# Patient Record
Sex: MALE | Race: BLACK OR AFRICAN AMERICAN | Employment: FULL TIME | URBAN - METROPOLITAN AREA
[De-identification: names, ages, dates, MRNs, and addresses within clinical notes are randomized per-mention and may not be internally consistent; named-entity substitution may affect disease eponyms.]

---

## 2021-03-20 ENCOUNTER — HOSPITAL ENCOUNTER (EMERGENCY)
Age: 36
Discharge: HOME | End: 2021-03-20
Attending: EMERGENCY MEDICINE
Payer: COMMERCIAL

## 2021-03-20 VITALS
RESPIRATION RATE: 22 BRPM | SYSTOLIC BLOOD PRESSURE: 137 MMHG | HEART RATE: 97 BPM | WEIGHT: 246.03 LBS | DIASTOLIC BLOOD PRESSURE: 90 MMHG | OXYGEN SATURATION: 100 % | BODY MASS INDEX: 35.3 KG/M2 | TEMPERATURE: 98.1 F

## 2021-03-20 DIAGNOSIS — R03.0 ELEVATED BLOOD PRESSURE READING: ICD-10-CM

## 2021-03-20 DIAGNOSIS — F43.29 ADJUSTMENT DISORDER WITH OTHER SYMPTOM: Primary | ICD-10-CM

## 2021-03-20 DIAGNOSIS — G47.00 INSOMNIA, UNSPECIFIED TYPE: ICD-10-CM

## 2021-03-20 PROCEDURE — 99283 EMERGENCY DEPT VISIT LOW MDM: CPT

## 2021-03-20 RX ORDER — ZOLPIDEM TARTRATE 5 MG/1
5 TABLET ORAL NIGHTLY PRN
Qty: 2 TABLET | Refills: 0 | Status: SHIPPED | OUTPATIENT
Start: 2021-03-20 | End: 2021-03-22

## 2021-03-20 NOTE — ED NOTES
Pt tearful on exam    Pt denies any thoughts of harming self or others     Kimberlyn Sales RN  03/20/21 7318

## 2021-03-20 NOTE — ED NOTES
Transfer center called for infectious  Disease consult to call back     Anthony Goldman RN  03/20/21 8738

## 2021-03-20 NOTE — ED PROVIDER NOTES
CHIEF COMPLAINT  Other (pt states was told recently positive for HIV  states needs help with follow up)      HISTORY OF PRESENT ILLNESS  Dayna Almodovar is a 37 y.o. male who presents to the ED complaining of stress. The patient states 5 days ago he was having some symptoms and presented to an outside hospital emergency department. During that work-up they sent an HIV test on the patient. It came back positive and they contacted the patient and let them know that he had HIV. He states he was given resources but those resources have not yet called him and he is just having difficulty processing the stress of being diagnosed with HIV, thinking through the difficult conversations that he needs to have with family and friends, and its causing it to be difficult to sleep at night which is making it harder yet again to process this. He denies any suicidal or homicidal ideation. He denies any symptoms at this time. No other complaints, modifying factors or associated symptoms. Nursing notes reviewed. Past Medical History:   Diagnosis Date    Strong-Parkinson-White syndrome      Past Surgical History:   Procedure Laterality Date    ABLATION OF DYSRHYTHMIC FOCUS       No family history on file.   Social History     Socioeconomic History    Marital status: Single     Spouse name: Not on file    Number of children: Not on file    Years of education: Not on file    Highest education level: Not on file   Occupational History    Not on file   Social Needs    Financial resource strain: Not on file    Food insecurity     Worry: Not on file     Inability: Not on file    Transportation needs     Medical: Not on file     Non-medical: Not on file   Tobacco Use    Smoking status: Current Every Day Smoker     Types: Cigars    Smokeless tobacco: Current User   Substance and Sexual Activity    Alcohol use: Yes     Comment: occassionally    Drug use: Yes     Types: Marijuana    Sexual activity: Not on file Lifestyle    Physical activity     Days per week: Not on file     Minutes per session: Not on file    Stress: Not on file   Relationships    Social connections     Talks on phone: Not on file     Gets together: Not on file     Attends [employer] service: Not on file     Active member of club or organization: Not on file     Attends meetings of clubs or organizations: Not on file     Relationship status: Not on file    Intimate partner violence     Fear of current or ex partner: Not on file     Emotionally abused: Not on file     Physically abused: Not on file     Forced sexual activity: Not on file   Other Topics Concern    Not on file   Social History Narrative    Not on file     No current facility-administered medications for this encounter. Current Outpatient Medications   Medication Sig Dispense Refill    zolpidem (AMBIEN) 5 MG tablet Take 1 tablet by mouth nightly as needed for Sleep for up to 2 days. 2 tablet 0     No Known Allergies    REVIEW OF SYSTEMS  6 systems reviewed, pertinent positives per HPI otherwise noted to be negative    PHYSICAL EXAM  BP (!) 143/96   Pulse 100   Temp 98.1 °F (36.7 °C) (Oral)   Resp 22   Wt 246 lb 0.5 oz (111.6 kg)   SpO2 100%   BMI 35.30 kg/m²   GENERAL APPEARANCE: Awake and alert. Cooperative. No acute distress. HEAD: Normocephalic. Atraumatic. EYES: PERRL. EOM's grossly intact. No scleral icterus  ENT: Mucous membranes are moist.  No thrush  NECK: Supple. Normal ROM. CHEST: Equal symmetric chest rise. LUNGS: Breathing is unlabored. Speaking comfortably in full sentences. SKIN: Warm and dry. NEUROLOGICAL: Alert and oriented. Normal coordination. Gait normal.   Psychiatric: Tearful at times during the history. He adamantly denies any suicidal or homicidal ideation but states he is frustrated with the diagnosis. RADIOLOGY  X-RAYS:  I have reviewed radiologic plain film image(s).   ALL OTHER NON-PLAIN FILM IMAGES SUCH AS CT, ULTRASOUND AND MRI HAVE BEEN READ BY THE RADIOLOGIST. No orders to display              PROCEDURES    ED COURSE/MDM  Patient seen and evaluated. I was fortunate that the ED was not busy and I was able to spend a lengthy amount of time talking with the patient about what his needs are. He reports that he needs help establishing follow-up with an infectious disease specialist.  The patient works at [employer] and would like to see an infectious disease specialist outside of  if possible. I will consult my infectious disease doctor and see if he/she are able to take new patients. We will also give the patient Linda, her 's cell phone number so that he can reach out to her if he is struggling to get established with a medical provider. She has been extremely helpful in helping her patients navigate the healthcare system. I told the patient I was amenable to a 1 or 2-day supply of Ambien to try to reset his sleep cycle. This patient is being very upfront and forthright with me. He is adamant that he does not have any suicidal or homicidal ideations, he has just been struggling with issues with embarrassment, frustration over his recent diagnosis of HIV. We talked about some strategies of how to approach the conversations with his family. He contracted for safety and states he would certainly come back should he have any thoughts of hurting himself or others. I reassured the patient that while he was recently diagnosed with HIV it does not change how wonderful he is is a human being, and how much people care for him, and how much we value him. The patient expressed gratitude for our ability to have a conversation about some of these issues.   At this time I see no emergent medical issues that need further work-up in the ED or referral.  The patient was strongly encouraged to come back immediately should he have any thoughts of harming himself or others, or should he have further difficulties that arise. Lastly the patient's blood pressure was elevated in the ED. He will be given the no PCP follow-up process for repeat blood pressure in 7 to 10 days. I do feel patient can be safely discharged to home. Recommend follow up with infectious disease for re-evaluation. Reasons to RT ED discussed. Patient expresses understanding and is in agreement with plan. Patient's HR is 100, but smoked just prior to arrival.  I do not think he is septic. Patient was given scripts for the following medications. I counseled patient how to take these medications. New Prescriptions    ZOLPIDEM (AMBIEN) 5 MG TABLET    Take 1 tablet by mouth nightly as needed for Sleep for up to 2 days. CLINICAL IMPRESSION  1. Adjustment disorder with other symptom    2. Insomnia, unspecified type    3. Elevated blood pressure reading        Blood pressure (!) 143/96, pulse 100, temperature 98.1 °F (36.7 °C), temperature source Oral, resp. rate 22, weight 246 lb 0.5 oz (111.6 kg), SpO2 100 %. DISPOSITION  Patient was discharged to home in good condition.          Paz Addison MD  03/20/21 2726

## 2021-03-30 ENCOUNTER — OFFICE VISIT (OUTPATIENT)
Dept: PSYCHOLOGY | Age: 36
End: 2021-03-30
Payer: COMMERCIAL

## 2021-03-30 DIAGNOSIS — F43.23 ADJUSTMENT DISORDER WITH MIXED ANXIETY AND DEPRESSED MOOD: Primary | ICD-10-CM

## 2021-03-30 PROCEDURE — 90791 PSYCH DIAGNOSTIC EVALUATION: CPT | Performed by: PSYCHOLOGIST

## 2021-03-30 ASSESSMENT — PATIENT HEALTH QUESTIONNAIRE - PHQ9
2. FEELING DOWN, DEPRESSED OR HOPELESS: 2
5. POOR APPETITE OR OVEREATING: 0
8. MOVING OR SPEAKING SO SLOWLY THAT OTHER PEOPLE COULD HAVE NOTICED. OR THE OPPOSITE, BEING SO FIGETY OR RESTLESS THAT YOU HAVE BEEN MOVING AROUND A LOT MORE THAN USUAL: 1
4. FEELING TIRED OR HAVING LITTLE ENERGY: 1
SUM OF ALL RESPONSES TO PHQ QUESTIONS 1-9: 9
1. LITTLE INTEREST OR PLEASURE IN DOING THINGS: 0
9. THOUGHTS THAT YOU WOULD BE BETTER OFF DEAD, OR OF HURTING YOURSELF: 0
SUM OF ALL RESPONSES TO PHQ QUESTIONS 1-9: 9

## 2021-03-30 ASSESSMENT — ANXIETY QUESTIONNAIRES
7. FEELING AFRAID AS IF SOMETHING AWFUL MIGHT HAPPEN: 2-OVER HALF THE DAYS
1. FEELING NERVOUS, ANXIOUS, OR ON EDGE: 2-OVER HALF THE DAYS
6. BECOMING EASILY ANNOYED OR IRRITABLE: 0-NOT AT ALL
2. NOT BEING ABLE TO STOP OR CONTROL WORRYING: 2-OVER HALF THE DAYS
GAD7 TOTAL SCORE: 10
3. WORRYING TOO MUCH ABOUT DIFFERENT THINGS: 2-OVER HALF THE DAYS

## 2021-03-30 NOTE — PATIENT INSTRUCTIONS
Patient Education        Nickie-Parkinson-White (WPW) Syndrome: Care Instructions  Your Care Instructions     Nickie-Parkinson-White (WPW) syndrome is a heart rhythm problem that causes a very fast heart rate. It happens because you have an extra electrical pathway in your heart. WPW is a congenital heart problem. This means you were born with the problem. You may have a fast heart rate or feel a fluttering in your chest (palpitations), feel lightheaded or dizzy, or faint. When you have these symptoms, it is called an episode. You may never have an episode, rarely have one, or have one once or twice a week. Very rarely, a WPW episode can trigger a heart rhythm that can cause death. Your doctor may prescribe medicines to help slow down your heartbeat. Your doctor may also suggest you try vagal maneuvers when having an episode of WPW. These are things, like bearing down, that might help slow your heart rate. Bearing down means that you try to breathe out with your stomach muscles but you don't let air out of your nose or mouth. Your doctor can show you how to do vagal maneuvers. He or she may suggest that you lie down on your back to do them. In some cases, a procedure called catheter ablation is done. Follow-up care is a key part of your treatment and safety. Be sure to make and go to all appointments, and call your doctor if you are having problems. It's also a good idea to know your test results and keep a list of the medicines you take. How can you care for yourself at home? · Take your medicines exactly as prescribed. Call your doctor if you think you are having a problem with your medicine. You will get more details on the specific medicines your doctor prescribes. · If your doctor showed you how to do vagal maneuvers, try them when you have an episode. These maneuvers include bearing down or putting an ice-cold, wet towel on your face. · Monitor your condition by keeping a diary of your WPW episodes. Bring this to your doctor appointments. First, you'll need to count your heart rate (take your pulse). · After you check your heart rate, write down:  ? How fast or slow your heart was beating. ? If your heart rhythm was regular or irregular. ? What symptoms you had.  ? The time of day your symptoms occurred. ? How long your symptoms lasted. ? What you were doing when your symptoms started. ? What may have helped your symptoms go away. · If they trigger episodes, limit or avoid alcohol or drinks with caffeine. · Do not use over-the-counter decongestants, diet pills, or \"pep\" pills. They often contain ingredients that make your heart beat faster (stimulants). · Do not use illegal drugs, such as cocaine, ecstasy, or methamphetamine, which can speed up your heart's rhythm. · Do not smoke. Smoking can make this condition worse. If you need help quitting, talk to your doctor about stop-smoking programs and medicines. These can increase your chances of quitting for good. When should you call for help? Call 911 anytime you think you may need emergency care. For example, call if:    · You passed out (lost consciousness).     · You are short of breath. Call your doctor now or seek immediate medical care if:    · You have a fast heartbeat.     · You are dizzy or lightheaded, or you feel like you may faint. Watch closely for changes in your health, and be sure to contact your doctor if:    · You do not get better as expected. Where can you learn more? Go to https://[email or web address].com. org and sign in to your [employer] account. Enter B246 in the [facility] box to learn more about \"Nickie-Parkinson-White (WPW) Syndrome: Care Instructions. \"     If you do not have an account, please click on the \"Sign Up Now\" link. Current as of: August 31, 2020               Content Version: 12.8  © 5450-2673 [employer]wise, Incorporated. Care instructions adapted under license by [facility] ([facility]).  If you have questions about a medical condition or this instruction, always ask your healthcare professional. [place] [address] any warranty or liability for your use of this information.

## 2021-03-30 NOTE — PATIENT INSTRUCTIONS
The [address]) Consultant giving you this message provides team-based care to treat the mind and body. He works directly with your doctor, who will always stay in charge of your care. Most [facility] visits are 30 minutes or less. Usually, the [facility] provider and your doctor continue to see you until you are starting to do better and have a good plan in place for continued progress. Once that happens, most patients follow-up with just their doctor (though the [facility] provider remains available to you as needed). If you are not improving, or if you desire outside mental health treatment, or if your doctor recommends more specialized help, we will be happy to help you find a mental health specialist in the community. Please also note your health insurance may be billed for [facility] visits. Check with your insurance company, and tell the [facility] provider, if you have any questions about your [facility] coverage. Diaphragmatic Breathing Exercises    Exercise 1:  The Stimulating Breath (also called the Arianne Breath)  This exercise aims to raise energy level and increase alertness. - Inhale and exhale rapidly through your nose, keeping your mouth closed but relaxed. Your breaths in and out should be equal in duration, but as short as possible. This is a noisy breathing exercise. - Try for three in-and-out breath cycles per second. This produces a quick movement of the diaphragm, suggesting a arianne. Breathe normally after each cycle. - Do not do for more than 15 seconds on your first try. Each time you practice the Stimulating Breath, you can increase your time by five seconds or so, until you reach a full minute. If done properly, you may feel invigorated, comparable to the heightened awareness you feel after a good workout. You should feel the effort at the back of the neck, the diaphragm, the chest and the abdomen. Try this breathing exercise the next time you need an energy boost and feel yourself reaching for a cup of coffee. Exercise 2:  The 4-7-8 (or Relaxing Breath) Exercise  This exercise is utterly simple, takes almost no time, requires no equipment and can be done anywhere. Although you can do the exercise in any position, sit with your back straight while learning the exercise. Place the tip of your tongue against the ridge of tissue just behind your upper front teeth, and keep it there through the entire exercise. You will be exhaling through your mouth around your tongue; try pursing your lips slightly if this seems awkward.  Exhale completely through your mouth, making a whoosh sound.  Close your mouth and inhale quietly through your nose to a mental count of four.  Hold your breath for a count of seven.  Exhale completely through your mouth, making a whoosh sound to a count of eight.  This is one breath. Now inhale again and repeat the cycle three more times for a total of four breaths. Note that you always inhale quietly through your nose and exhale audibly through your mouth. The tip of your tongue stays in position the whole time. Exhalation takes twice as long as inhalation. The absolute time you spend on each phase is not important; the ratio of 4:7:8 is important. If you have trouble holding your breath, speed the exercise up but keep to the ratio of 4:7:8 for the three phases. With practice you can slow it all down and get used to inhaling and exhaling more and more deeply. This exercise is a natural tranquilizer for the nervous system. Unlike tranquilizing drugs, which are often effective when you first take them but then lose their power over time, this exercise is subtle when you first try it but gains in power with repetition and practice. Do it at least twice a day. You cannot do it too frequently. Do NOT do more than 4 breaths at one time for the first month of practice. Later, if you wish, you can extend it to eight breaths.  If you feel a little lightheaded when you first breathe this way, do not be concerned; it will pass. Once you develop this technique by practicing it every day, it will be a very useful tool that you will always have with you. Use it whenever anything upsetting happens - before you react. Use it whenever you are aware of internal tension. Use it to help you fall asleep. This exercise cannot be recommended too highly. Everyone can benefit from it. Exercise 3:   Meditation Exercise: Breath Counting  If you want to get a feel for this challenging work, try your hand at breath counting, a deceptively simple technique. Sit in a comfortable position with the spine straight and head inclined slightly forward. Gently close your eyes and take a few deep breaths. Then let the breath come naturally without trying to influence it. Ideally it will be quiet and slow, but depth and rhythm may vary.  To begin the exercise, count \"one\" to yourself as you exhale.  The next time you exhale, count \"two,\" and so on up to [place]. \"   Then begin a new cycle, counting \"one\" on the next exhalation. Never count higher than \"five,\" and count only when you exhale. You will know your attention has wandered when you find yourself up to \"eight,\" \"12,\" even \"19. \"  Try to do 10 minutes of this form of meditation. Personal Thought  Control. Our thinking often creates anxiety for us. Getting better control of our thinking can go a long way in helping us cope. The following steps can be useful. 1. Let yourself become aware of thoughts you have when you are anxious. What are the words that you are saying to yourself at that moment? Sometimes it takes a little practice before we become aware of our thoughts. Some examples might be:  I know something bad is going to happen, or This is horrible or Clois Franco is this happening to me!?  2. Write your thoughts down.   Its much easier to work with our thoughts, analyze them, and replace them if they are in black and white.   3. Ask yourself the following questions about your thoughts:  a. Is it true? (Is it logically correct? Where is the evidence to support the truth of that thought? Are there alternative ways of thinking that would be more correct?). If a thought is not as true as it could be, replace it with a more realistic and helpful one. The majority of thoughts we have that generate anxiety are not the most realistic appraisals of the situation. b. So what? (If this is logically correct, what does it mean to me? Is there anything I can do about the situation? Is it in my best interest to get anxious about this?). 4. Use coping self-statements. When feeling anxious, you may be able to tell yourself automatic phrases without thinking too much about it. A couple of examples would be phrases such as Its OK, I can handle it, or Ive been through things like this before and have done all right.   Notice that these statements tend to be true for all of us. 5. Notice a change in your emotional state as you change your thinking. As your thoughts become more realistic, you will probably notice a decrease in anxiety and tension, and an increase in your ability to cope.

## 2021-03-30 NOTE — PROGRESS NOTES
Behavioral Health Consultation  Chely Rubalcava, Ph.D.  Psychologist  3/30/2021  2:30 PM EDT      Time spent with Patient: 30 minutes  This is patient's first [facility] appointment. Reason for Consult: worry, stress  Referring Provider: JUDD Alcantar-CNP      Feedback for PCP:     Pt provided informed consent for the behavioral health program. Discussed with patient model of service to include the limits of confidentiality (i.e. abuse reporting, suicide intervention, etc.) and short-term intervention focused approach. Pt indicated understanding. Feedback given to PCP. S:  Patient reports worry, being frightened, stress related to HIV positive diagnosis. He said that \"in the [employer], this is not accepted. \" He said that he has a sister with whom he can talk to, but he has decided to wait until his appointment with his infectious disease doc on April 15 when he can collect all the facts, to then tell his sister. He talked about the fact that people seem to be drawn to him as a happy person who \"will just start dancing for no reason. 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 He described a tragedy in which a new very good friend was murdered and that the body was brought to the hospital where he worked He said that he sought out some counseling to help him through that.          Social History     Tobacco Use    Smoking status: Current Every Day Smoker     Packs/day: 0.25     Types: Cigars    Smokeless tobacco: Current User   Substance Use Topics    Alcohol use: Yes     Frequency: Monthly or less     Drinks per session: 5 or 6     Binge frequency: Less than monthly     Comment: occassionally        Illicit drugs:   Social History     Substance and Sexual Activity   Drug Use Yes    Types: Marijuana        O:  MSE:  Appearance: good hygiene   Attitude: cooperative and friendly  Consciousness: alert  Orientation: oriented to person, place, time, general circumstance  Memory: recent and remote memory intact  Attention/Concentration: intact during session  Psychomotor Activity: normal  Eye Contact: normal  Speech: normal rate and volume, well-articulated  Mood: stressed, scared  Affect: congruent  Perception: within normal limits  Thought Content: within normal limits  Thought Process: logical, coherent and goal-directed  Insight: good  Judgment: intact  Ability to understand instructions: Yes  Ability to respond meaningfully: Yes  Morbid Ideation: no   Suicide Assessment: no suicidal ideation, plan, or intent  Homicidal Ideation: no    A:  We talked about how he is handling the diagnosis, and the basis for his fear. He seems sure that after he gets all the facts from the doc on April 15, he will have less fear and will rely on facts. He said someone told him that the diagnosis didn't change who he was, that he is still the same person that people respond to. We talked about his diagnosis as a loss, and that there is grief attached to this dramatic change in his health and lifestyle, and that that is quite normal.    CARLENE 7 SCORE 3/30/2021   CARLENE-7 Total Score 10     Interpretation of CARLENE-7 score: 5-9 = mild anxiety, 10-14 = moderate anxiety, 15+ = severe anxiety. Recommend referral to behavioral health for scores 10 or greater. PHQ Scores 3/30/2021 3/25/2021   PHQ2 Score 2 1   PHQ9 Score 9 1     Interpretation of Total Score Depression Severity: 1-4 = Minimal depression, 5-9 = Mild depression, 10-14 = Moderate depression, 15-19 = Moderately severe depression, 20-27 = Severe depression    Diagnosis:    1.  Adjustment disorder with mixed anxiety and depressed mood              Plan:  Pt interventions:  Established rapport, [place]-setting to identify pt's primary goals for [employer] [facility] visit / overall health, Supportive techniques, Provided Psychoeducation re: managing stress with deep breathing and imagery  and Cognitive strategies to target worry ruminations.        Pt Behavioral Change Plan:  Pt set the following goals:  Pt will schedule F/U visit as needed  Pt will practice diaphragmatic breathing exercises

## 2021-03-30 NOTE — PROGRESS NOTES
[address]      Cardiology Consult    Jonas Diamond  1985    March 31, 2021    Referring Physician: Marques Del Rio NP   Reason for Referral: Chest pain, history of WPW    CC: \"I needed a new heart doctor. \"     HPI:  The patient is 37 y.o. 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 He established care with his primary car physician after receiving a positive HIV test. He was referred to infectious disease and psychology. He reported a prior history of WPW in 2014 and underwent ablation. He followed with  cardiology and was last seen in 2015. He reported intermittent chest pains for the past month which prompted the referral today. He states the chest pains mostly occur with \"stress. \" He reports the pains since 2014 but feels the symptoms are more frequent since being diagnosed with HIV. He has no other cardiac sounding complaints. He denies any new exertional chest pains, palpitations, or sensation of his heart racing. He states overall he is feeling \"fine. \" He reports a family history of CAD with his mother having prior PCI. He continues to smoke but states he knows he should quit. Patient denies exertional chest pain/pressure, dyspnea at rest, worsening NG, PND, orthopnea, palpitations, lightheadedness, weight changes, changes in LE edema, and syncope.     Past Medical History:   Diagnosis Date    Strong-Parkinson-White syndrome      Past Surgical History:   Procedure Laterality Date    ABLATION OF DYSRHYTHMIC FOCUS       Family History   Problem Relation Age of Onset    Hypertension Mother     Cancer Mother     Heart Disease Mother     Diabetes Father     Stroke Father     Colon Cancer Maternal Grandfather     Colon Cancer Maternal Cousin      Social History     Tobacco Use    Smoking status: Current Every Day Smoker     Packs/day: 0.25     Types: Cigars    Smokeless tobacco: Current User   Substance Use Topics    Alcohol use: Yes     Frequency: Monthly or less     Drinks per session: 5 or 6     Binge frequency: Less than monthly     Comment: occassionally    Drug use: Yes     Types: Marijuana     Allergies   Allergen Reactions    No Known Allergies      No current outpatient medications on file. No current facility-administered medications for this visit. Review of Systems:  · Constitutional: No unanticipated weight loss. There's been no change in energy level, sleep pattern, or activity level. No fevers, chills. · Eyes: No visual changes or diplopia. No scleral icterus. · ENT: No Headaches, hearing loss or vertigo. No mouth sores or sore throat. · Cardiovascular: as reviewed in HPI  · Respiratory: No cough or wheezing, no sputum production. No hemoptysis. · Gastrointestinal: No abdominal pain, appetite loss, blood in stools. No change in bowel or bladder habits. · Genitourinary: No dysuria, trouble voiding, or hematuria. · Musculoskeletal:  No gait disturbance, no joint complaints. · Integumentary: No rash or pruritis. · Neurological: No headache, diplopia, change in muscle strength, numbness or tingling. · Psychiatric: No anxiety or depression. · Endocrine: No temperature intolerance. No excessive thirst, fluid intake, or urination. No tremor. · Hematologic/Lymphatic: No abnormal bruising or bleeding, blood clots or swollen lymph nodes. · Allergic/Immunologic: No nasal congestion or hives. Physical Exam:   /86   Pulse 97   Temp 96.9 °F (36.1 °C)   Ht 5' 10\" (1.778 m)   Wt 249 lb 9.6 oz (113.2 kg)   SpO2 99%   BMI 35.81 kg/m²   Wt Readings from Last 3 Encounters:   03/31/21 249 lb 9.6 oz (113.2 kg)   03/25/21 244 lb 6.4 oz (110.9 kg)   03/20/21 246 lb 0.5 oz (111.6 kg)     Constitutional: He is oriented to person, place, and time. He appears well-developed and well-nourished. In no acute distress.    Head: Normocephalic and

## 2021-03-31 ENCOUNTER — OFFICE VISIT (OUTPATIENT)
Dept: CARDIOLOGY CLINIC | Age: 36
End: 2021-03-31
Payer: COMMERCIAL

## 2021-03-31 VITALS
TEMPERATURE: 96.9 F | OXYGEN SATURATION: 99 % | WEIGHT: 249.6 LBS | DIASTOLIC BLOOD PRESSURE: 86 MMHG | HEIGHT: 70 IN | BODY MASS INDEX: 35.73 KG/M2 | SYSTOLIC BLOOD PRESSURE: 132 MMHG | HEART RATE: 97 BPM

## 2021-03-31 DIAGNOSIS — Z72.0 NICOTINE ABUSE: ICD-10-CM

## 2021-03-31 DIAGNOSIS — R07.89 ATYPICAL CHEST PAIN: Primary | ICD-10-CM

## 2021-03-31 DIAGNOSIS — I45.6 WPW (WOLFF-PARKINSON-WHITE SYNDROME): ICD-10-CM

## 2021-03-31 PROCEDURE — 99203 OFFICE O/P NEW LOW 30 MIN: CPT | Performed by: INTERNAL MEDICINE

## 2021-04-08 ENCOUNTER — OFFICE VISIT (OUTPATIENT)
Dept: INFECTIOUS DISEASES | Age: 36
End: 2021-04-08
Payer: COMMERCIAL

## 2021-04-08 VITALS
HEIGHT: 70 IN | HEART RATE: 121 BPM | WEIGHT: 242.1 LBS | TEMPERATURE: 96.9 F | DIASTOLIC BLOOD PRESSURE: 82 MMHG | OXYGEN SATURATION: 98 % | BODY MASS INDEX: 34.66 KG/M2 | SYSTOLIC BLOOD PRESSURE: 126 MMHG

## 2021-04-08 DIAGNOSIS — B20 HUMAN IMMUNODEFICIENCY VIRUS (HCC): Primary | ICD-10-CM

## 2021-04-08 DIAGNOSIS — Z11.1 VISIT FOR MANTOUX TEST: ICD-10-CM

## 2021-04-08 DIAGNOSIS — B20 HUMAN IMMUNODEFICIENCY VIRUS (HCC): ICD-10-CM

## 2021-04-08 DIAGNOSIS — R07.89 ATYPICAL CHEST PAIN: ICD-10-CM

## 2021-04-08 LAB
BASOPHILS ABSOLUTE: 0 K/UL (ref 0–0.2)
BASOPHILS RELATIVE PERCENT: 0.3 %
EOSINOPHILS ABSOLUTE: 0.1 K/UL (ref 0–0.6)
EOSINOPHILS RELATIVE PERCENT: 1.7 %
HCT VFR BLD CALC: 40.3 % (ref 40.5–52.5)
HEMOGLOBIN: 13.3 G/DL (ref 13.5–17.5)
LYMPHOCYTES ABSOLUTE: 1.3 K/UL (ref 1–5.1)
LYMPHOCYTES RELATIVE PERCENT: 33.4 %
MCH RBC QN AUTO: 29.2 PG (ref 26–34)
MCHC RBC AUTO-ENTMCNC: 33 G/DL (ref 31–36)
MCV RBC AUTO: 88.7 FL (ref 80–100)
MONOCYTES ABSOLUTE: 0.6 K/UL (ref 0–1.3)
MONOCYTES RELATIVE PERCENT: 15.8 %
NEUTROPHILS ABSOLUTE: 2 K/UL (ref 1.7–7.7)
NEUTROPHILS RELATIVE PERCENT: 48.8 %
PDW BLD-RTO: 13.4 % (ref 12.4–15.4)
PLATELET # BLD: 243 K/UL (ref 135–450)
PMV BLD AUTO: 7.3 FL (ref 5–10.5)
RBC # BLD: 4.54 M/UL (ref 4.2–5.9)
WBC # BLD: 4 K/UL (ref 4–11)

## 2021-04-08 PROCEDURE — 86580 TB INTRADERMAL TEST: CPT | Performed by: INTERNAL MEDICINE

## 2021-04-08 PROCEDURE — 99205 OFFICE O/P NEW HI 60 MIN: CPT | Performed by: INTERNAL MEDICINE

## 2021-04-08 NOTE — PROGRESS NOTES
Infectious Diseases HIV Outpatient Note    HIV history:   [address] [address] Physician:  Gina Fung, JUDD - CNP  Initial Visit:   4/8/21  History Obtained From:   Patient    CHIEF COMPLAINT:    Chief Complaint   Patient presents with   Imelda Pickens Doctor     Ref by Er, recently dx with HIV       HISTORY OF PRESENT ILLNESS / Interval history:      36 yo hx WPW, hx ablation     4/8/21 New consult / patient   New dx HIV     Pt seen at [address] [place] [address] ED with rectal pain on 3/15, had test for STD, HIV  HIV test reactive. Seen in ED [facility] 3/20, rx Art Ashley, referred to me  Seen by PCP 3/25  Seen by Psychologist 3/30    Pt reports he was surprised, upset, at time tearful regarding dx.  'did not expect it'  Has had no sx or related conditions (worse skin problem, shingles)    Sexual History:    Sexual relations with both men / woman. Pt reports 1 partners in last 1 mo, 2 in last 3 mo, 5 in last 6 mo, hx unprotected intercourse, hx receptive and insertive anal intercourse. Hx sexual encounter with HIV + person. No hx anonymous partner. No hx payment / drugs for sex. Hx STD (GC or chlamydia). No hx syphilis. Last neg HIV test - 2010    No complaint today    Past Medical History:    Past Medical History:   Diagnosis Date    Strong-Parkinson-White syndrome        Past Surgical History:    Past Surgical History:   Procedure Laterality Date    ABLATION OF DYSRHYTHMIC FOCUS         Current Medications:    No current outpatient medications on file. No current facility-administered medications for this visit.         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 Discussed HIV meds / ART, start next visit   2. Labs today - CBC w diff, CMP, CD4 lymph panel, HIV RNA VL, HIV genotype, Hep A / B / C, syphilis ab / cascade, G6PD, fasting lipids, vit D 25-OH,   3. HIV care: HIV database / forms: see above - PPD planted - self read, call Mon   - Baseline labs - see above   - ART - have not started   - Vaccines - see above; address next visit    - Anal cancer screen -  exam with rectal cytology 4/8/21   - HIV counseling: discussed disclosure of dx, safe sexual practices. Discussed revealing dx to family, others - initial visit, educated on HIV, CD4/VL, w/u, treatment   4. RHCM   - Diet - discussed   - Exercise - discussed    - Mental health - discussed, especial reaction to new diagnosis    - Substance use: EtOH - 'seldom', no drugs    - Spent over 70 minutes on visit - initial visit  (including history, physical exam, review of data, development and implementation of treatment plan and coordination of care. - Over 50% of time spent in pt counseling and education.     Donald Wiggins MD

## 2021-04-09 LAB
A/G RATIO: 1.3 (ref 1.1–2.2)
ALBUMIN SERPL-MCNC: 4.6 G/DL (ref 3.4–5)
ALP BLD-CCNC: 66 U/L (ref 40–129)
ALT SERPL-CCNC: 14 U/L (ref 10–40)
ANION GAP SERPL CALCULATED.3IONS-SCNC: 11 MMOL/L (ref 3–16)
AST SERPL-CCNC: 14 U/L (ref 15–37)
BILIRUB SERPL-MCNC: 0.3 MG/DL (ref 0–1)
BUN BLDV-MCNC: 10 MG/DL (ref 7–20)
CALCIUM SERPL-MCNC: 9.3 MG/DL (ref 8.3–10.6)
CHLORIDE BLD-SCNC: 104 MMOL/L (ref 99–110)
CHOLESTEROL, FASTING: 128 MG/DL (ref 0–199)
CO2: 26 MMOL/L (ref 21–32)
CREAT SERPL-MCNC: 0.8 MG/DL (ref 0.9–1.3)
GFR AFRICAN AMERICAN: >60
GFR NON-AFRICAN AMERICAN: >60
GLOBULIN: 3.5 G/DL
GLUCOSE BLD-MCNC: 95 MG/DL (ref 70–99)
HDLC SERPL-MCNC: 21 MG/DL (ref 40–60)
HEPATITIS C ANTIBODY INTERPRETATION: NORMAL
LDL CHOLESTEROL CALCULATED: 87 MG/DL
POTASSIUM SERPL-SCNC: 3.6 MMOL/L (ref 3.5–5.1)
SODIUM BLD-SCNC: 141 MMOL/L (ref 136–145)
TOTAL PROTEIN: 8.1 G/DL (ref 6.4–8.2)
TRIGLYCERIDE, FASTING: 101 MG/DL (ref 0–150)
VLDLC SERPL CALC-MCNC: 20 MG/DL

## 2021-04-11 LAB
CHLAMYDIA TRACHOMATIS AMPLIFIED DET: NEGATIVE
CHLAMYDIA TRACHOMATIS AMPLIFIED DET: NEGATIVE
HIV-1 QNT LOG, IU/ML: 4.28 LOG CPY/ML
HIV-1 QNT, IU/ML: ABNORMAL
INTERPRETATION: DETECTED
N GONORRHOEAE AMPLIFIED DET: NEGATIVE
N GONORRHOEAE AMPLIFIED DET: NEGATIVE
SPECIMEN SOURCE: NORMAL
SPECIMEN SOURCE: NORMAL

## 2021-04-13 LAB — MISCELLANEOUS LAB TEST ORDER: NORMAL

## 2021-04-16 LAB
EER HIV-1 GENOTYPE BY SEQUENCE: NORMAL
HIV-1 GENOTYPE: NORMAL
HIV-1 INTEGRASE INHIBITOR RESISTANCE, SEQUENCE: NORMAL

## 2021-04-28 ENCOUNTER — OFFICE VISIT (OUTPATIENT)
Dept: INFECTIOUS DISEASES | Age: 36
End: 2021-04-28
Payer: COMMERCIAL

## 2021-04-28 VITALS
WEIGHT: 242 LBS | HEART RATE: 100 BPM | BODY MASS INDEX: 34.72 KG/M2 | DIASTOLIC BLOOD PRESSURE: 70 MMHG | OXYGEN SATURATION: 98 % | TEMPERATURE: 97.8 F | SYSTOLIC BLOOD PRESSURE: 138 MMHG

## 2021-04-28 DIAGNOSIS — B20 HUMAN IMMUNODEFICIENCY VIRUS (HCC): ICD-10-CM

## 2021-04-28 DIAGNOSIS — B20 HUMAN IMMUNODEFICIENCY VIRUS (HCC): Primary | ICD-10-CM

## 2021-04-28 LAB
CHOLESTEROL, FASTING: 124 MG/DL (ref 0–199)
HBV SURFACE AB TITR SER: 20.8 MIU/ML
HDLC SERPL-MCNC: 24 MG/DL (ref 40–60)
HEPATITIS B SURFACE ANTIGEN INTERPRETATION: NORMAL
LDL CHOLESTEROL CALCULATED: 62 MG/DL
TRIGLYCERIDE, FASTING: 192 MG/DL (ref 0–150)
VITAMIN D 25-HYDROXY: 9.6 NG/ML
VLDLC SERPL CALC-MCNC: 38 MG/DL

## 2021-04-28 PROCEDURE — 99215 OFFICE O/P EST HI 40 MIN: CPT | Performed by: INTERNAL MEDICINE

## 2021-04-28 NOTE — PROGRESS NOTES
Infectious Diseases HIV Outpatient Note    HIV history:   Dx 3/15/21 at [address] Physician:  Bennett Bhardwaj, APRN - CNP  Initial Visit:   4/8/21  History Obtained From:   Patient    CHIEF COMPLAINT:    Chief Complaint   Patient presents with    Follow-up     F/u HIV       HISTORY OF PRESENT ILLNESS / Interval history:      36 yo hx WPW, hx ablation     4/8/21 New consult / patient                                                                                                                                                                                                                                                                                                                     New dx HIV     Pt seen at [address] [place] [address] ED with rectal pain on 3/15, had test for STD, HIV  HIV test reactive. Seen in ED [facility] 3/20, rx Darlene Magallanes, referred to me  Seen by PCP 3/25  Seen by Psychologist 3/30    Pt reports he was surprised, upset, at time tearful regarding dx.  'did not expect it'  Has had no sx or related conditions (worse skin problem, shingles)    Sexual History:    Sexual relations with both men / woman. Pt reports 1 partners in last 1 mo, 2 in last 3 mo, 5 in last 6 mo, hx unprotected intercourse, hx receptive and insertive anal intercourse. Hx sexual encounter with HIV + person. No hx anonymous partner. No hx payment / drugs for sex. Hx STD (GC or chlamydia). No hx syphilis. Last neg HIV test - 2010 4/28/21 Follow--up visit  Pt doing well. Less 'upset' with diagnosis. Reviewed labs rio CD4/ VL.   Discussed meds, starting meds, adherence      Past Medical History:    Past Medical History:   Diagnosis Date    Strong-Parkinson-White syndrome        Past Surgical History:    Past Surgical History: Procedure Laterality Date    ABLATION OF DYSRHYTHMIC FOCUS         Current Medications:    No current outpatient medications on file. No current facility-administered medications for this visit. Allergies:  No known allergies    Social History:    TOBACCO:   + 'black and mild', 2/day  ETOH:   Occasional -    DRUGS:   + marijuana - daily     MARITAL STATUS:  Single      OCCUPATION:     Health care unit coordinator at [address]:   4/8/21 see above      Patient lives     Family History:   No immunodeficiency    REVIEW OF SYSTEMS:    No fever / chills / sweats. No weight loss. No visual change, eye pain, eye discharge. No oral lesion, sore throat, dysphagia. Denies cough / sputum. Denies chest pain, palpitations. Denies n / v / abd pain. No diarrhea. Denies dysuria or change in urinary function. Denies joint swelling or pain. No myalgia, arthralgia. Denies skin changes, rash, itching. Denies focal weakness, sensory change or other neurologic symptom. Denies new depression, other psychiatric problem  No lymph node swelling or tenderness. No symptoms endocrine disorder. No symptoms hematologic disorder    PHYSICAL EXAM:    Vitals:    /70 (Site: Right Upper Arm, Position: Sitting, Cuff Size: Large Adult)   Pulse 100   Temp 97.8 °F (36.6 °C) (Temporal)   Wt 242 lb (109.8 kg)   SpO2 98%   BMI 34.72 kg/m²     GENERAL: No apparent distress.     HEENT: Membranes moist, no oral lesion  NECK:  Supple  LYMPH: No adenopathy   LUNGS: Clear b/l, no rales, no dullness  CARDIAC: RRR, no murmur appreciated  ABD:  + BS, soft / NT  :  See 4/8/21  EXT:  No rash, no edema, no lesions  NEURO: No focal neurologic findings  PSYCH: Orientation, sensorium, mood normal    DATA:    See EPIC      IMPRESSION    HIV Health Maintenance:  HIV:  Diagnosis / RF 3/15/21 / MSM   HIV genotype  4/8/21 R sustiva / neviripine  HLA      CD4    4/8/21 336 (20%)  HIV VL   4/8/21 19,119  Therapy 4/28/21 Dovato     Vaccines:   Pneumovax       Prevnar     Meningococcal vax     Flu vax      HAV vax  4/8/21   HBV vax   4/8/21    Tdap    Td 2/23/10      Labs   G6PD   4/8/21    Syphilis antibody 4/8/21       PPD    4/8/21 neg     HCV    4/8/21 non-reactive    Lipid panel   4/8/21 - chol 128, HDL 21      / STD  Exam    4/8/21     GC/CT   4/8/21 urethral + rectal - neg / neg   Anal PAP/HPV  4/8/21 neg / neg    Referrals:  [facility]       Psych              PROBLEM LIST:  HIV - dx 3/15/21  Anal condyloma  Obesity - BMI 34.7  Adjustment d/o - reaction to new dx    RECOMMENDATIONS:      1. Start Dovato (dolutegravir / lamuvidine) - discussed / educated pt  2. Labs today - Hep A / B serologies, syphilis ab / cascade, G6PD, vit D 25-OH (studies not done 4/8)   3. HIV care: HIV database / forms: see above    - Baseline labs - see above   - ART - have not started   - Vaccines - see above; address next visit    - Anal cancer screen -  exam with rectal cytology 4/8/21 - neg / neg   - HIV counseling: discussed disclosure of dx, safe sexual practices. Discussed revealing dx to family, others   4. RHCM   - Diet - discussed   - Exercise - discussed    - Mental health - discussed   - Substance use: EtOH - 'seldom', no drugs    - Spent 45 minutes on visit  (including history, physical exam, review of data, development and implementation of treatment plan and coordination of care.)   - Over 50% of time spent in pt counseling and education.     Alexis Julien MD

## 2021-04-29 LAB — TOTAL SYPHILLIS IGG/IGM: NORMAL

## 2021-05-01 LAB — G-6-PD, QUANT: 13.9 U/G HB (ref 9.9–16.6)

## 2021-05-02 LAB
HAV AB SERPL IA-ACNC: NEGATIVE
HEPATITIS B CORE TOTAL ANTIBODY: NEGATIVE

## 2021-05-04 ENCOUNTER — OFFICE VISIT (OUTPATIENT)
Dept: SLEEP MEDICINE | Age: 36
End: 2021-05-04
Payer: COMMERCIAL

## 2021-05-04 VITALS
BODY MASS INDEX: 34.9 KG/M2 | WEIGHT: 243.8 LBS | DIASTOLIC BLOOD PRESSURE: 84 MMHG | RESPIRATION RATE: 16 BRPM | HEIGHT: 70 IN | OXYGEN SATURATION: 97 % | SYSTOLIC BLOOD PRESSURE: 112 MMHG | HEART RATE: 103 BPM | TEMPERATURE: 97.1 F

## 2021-05-04 DIAGNOSIS — J30.9 ALLERGIC RHINITIS, UNSPECIFIED SEASONALITY, UNSPECIFIED TRIGGER: ICD-10-CM

## 2021-05-04 DIAGNOSIS — G47.33 OBSTRUCTIVE SLEEP APNEA: Primary | ICD-10-CM

## 2021-05-04 DIAGNOSIS — E66.9 OBESITY (BMI 30.0-34.9): ICD-10-CM

## 2021-05-04 PROCEDURE — 99204 OFFICE O/P NEW MOD 45 MIN: CPT | Performed by: PSYCHIATRY & NEUROLOGY

## 2021-05-04 RX ORDER — FLUTICASONE PROPIONATE 50 MCG
2 SPRAY, SUSPENSION (ML) NASAL DAILY
Qty: 1 BOTTLE | Refills: 5 | Status: SHIPPED | OUTPATIENT
Start: 2021-05-04

## 2021-05-04 ASSESSMENT — SLEEP AND FATIGUE QUESTIONNAIRES
HOW LIKELY ARE YOU TO NOD OFF OR FALL ASLEEP WHILE LYING DOWN TO REST IN THE AFTERNOON WHEN CIRCUMSTANCES PERMIT: 3
HOW LIKELY ARE YOU TO NOD OFF OR FALL ASLEEP WHILE SITTING AND READING: 2
NECK CIRCUMFERENCE (INCHES): 18
ESS TOTAL SCORE: 17
HOW LIKELY ARE YOU TO NOD OFF OR FALL ASLEEP IN A CAR, WHILE STOPPED FOR A FEW MINUTES IN TRAFFIC: 0

## 2021-05-04 ASSESSMENT — ENCOUNTER SYMPTOMS
SHORTNESS OF BREATH: 1
EYES NEGATIVE: 1
GASTROINTESTINAL NEGATIVE: 1
ALLERGIC/IMMUNOLOGIC NEGATIVE: 1
APNEA: 1

## 2021-05-04 NOTE — PATIENT INSTRUCTIONS
Orders Placed This Encounter   Procedures    Home Sleep Study     Standing Status:   Future     Standing Expiration Date:   5/4/2022     Order Specific Question:   Location For Sleep Study     Answer:   [place]     Order Specific Question:   Select Sleep Lab Location     Answer:   [facility]    Sleep Study with PAP Titration     Standing Status:   Future     Standing Expiration Date:   5/4/2022     Order Specific Question:   Sleep Study Titration Type     Answer:   CPAP     Order Specific Question:   Location For Sleep Study     Answer:   [place]     Order Specific Question:   Select Sleep Lab Location     Answer:   [facility]        Patient Education        Learning About CPAP for Sleep Apnea  What is CPAP? CPAP is a small machine that you use at home every night while you sleep. It increases air pressure in your throat to keep your airway open. When you have sleep apnea, this can help you sleep better so you feel much better. CPAP stands for \"continuous positive airway pressure. \"  The CPAP machine will have one of the following:  · A mask that covers your nose and mouth  · Prongs that fit into your nose  · A mask that covers your nose only, which is the most common type. This type is called NCPAP. The N stands for \"nasal.\"  Why is it done? CPAP is usually the best treatment for obstructive sleep apnea. It is the first treatment choice and the most widely used. CPAP:  · Helps you have more normal sleep, so you feel less sleepy and more alert during the daytime. · May help keep heart failure or other heart problems from getting worse. · May help lower your blood pressure. If you use CPAP, your bed partner may also sleep better. That's because you aren't snoring or restless. Your doctor may suggest CPAP if you have:  · Moderate to severe sleep apnea. · Sleep apnea and coronary artery disease (CAD). · Sleep apnea and heart failure. What are the side effects?   Some people who use CPAP have:  · A dry or stuffy nose and a sore throat. · Irritated skin on the face. · Sore eyes. · Bloating. How can you care for yourself? If using CPAP is not comfortable, or if you have certain side effects, work with your doctor to fix them. Here are some things you can try:  · Be sure the mask or nasal prongs fit well. · See if your doctor can adjust the pressure of your CPAP. · If your nose is dry, try a humidifier. · If your nose is runny or stuffy, try decongestant medicine or a steroid nasal spray. Be safe with medicines. Read and follow all instructions on the label. Do not use the medicine longer than the label says. If these things don't help, you might try a different type of machine. Some machines have air pressure that adjusts on its own. Others have air pressures that are different when you breathe in than when you breathe out. This may reduce discomfort caused by too much pressure in your nose. Where can you learn more? Go to https://Lumi[employer]picBetaVersityeb.[employer] [email or web address] org and sign in to your shoutr account. Enter T454 in the [employer] box to learn more about \"Learning About CPAP for Sleep Apnea. \"     If you do not have an account, please click on the \"Sign Up Now\" link. Current as of: October 26, 2020               Content Version: 12.8  © 5181-4304 Healthwise, Incorporated. Care instructions adapted under license by [facility] ([facility]). If you have questions about a medical condition or this instruction, always ask your healthcare professional. [place] [address] any warranty or liability for your use of this information.

## 2021-05-04 NOTE — PROGRESS NOTES
an AHI - 9.9/hr with Low SaO2 - 83% and time below 90% of 1.8min. This is consistent with mild BYRON (327.23)    The Patient has been obese for many years and tried, has lost 20 in the last 4 years,      DOT/CDL - N/A  JAMAL/'humberto - N/A      Previous Report(s) Reviewed: historical medical records       Social History     Socioeconomic History    Marital status: Single     Spouse name: Not on file    Number of children: Not on file    Years of education: Not on file    Highest education level: Not on file   Occupational History    Not on file   Social Needs    Financial resource strain: Not hard at all   Unified Inbox insecurity     Worry: Never true     Inability: Never true   Pluralsight needs     Medical: No     Non-medical: No   Tobacco Use    Smoking status: Current Every Day Smoker     Packs/day: 0.25     Types: Cigars    Smokeless tobacco: Current User   Substance and Sexual Activity    Alcohol use: Yes     Frequency: Monthly or less     Drinks per session: 5 or 6     Binge frequency: Less than monthly     Comment: occassionally    Drug use: Yes     Types: Marijuana    Sexual activity: Yes     Partners: Female, Male   Lifestyle    Physical activity     Days per week: 0 days     Minutes per session: 0 min    Stress: Not at all   Relationships    Social connections     Talks on phone: Not on file     Gets together: Not on file     Attends [employer] service: Not on file     Active member of club or organization: Not on file     Attends meetings of clubs or organizations: Not on file     Relationship status: Not on file    Intimate partner violence     Fear of current or ex partner: Not on file     Emotionally abused: Not on file     Physically abused: Not on file     Forced sexual activity: Not on file   Other Topics Concern    Not on file   Social History Narrative    Not on file       Prior to Admission medications    Medication Sig Start Date End Date Taking?  Authorizing Provider fluticasone (FLONASE) 50 MCG/ACT nasal spray 2 sprays by Each Nostril route daily 5/4/21  Yes Humaira Cevallos MD       Allergies as of 05/04/2021 - Review Complete 05/04/2021   Allergen Reaction Noted    No known allergies  08/17/2013       There is no problem list on file for this patient. Past Medical History:   Diagnosis Date    Strong-Parkinson-White syndrome        Past Surgical History:   Procedure Laterality Date    ABLATION OF DYSRHYTHMIC FOCUS         Family History   Problem Relation Age of Onset    Hypertension Mother     Cancer Mother     Heart Disease Mother     Diabetes Father     Stroke Father     Colon Cancer Maternal Grandfather     Colon Cancer Maternal Cousin        Review of Systems   Constitutional: Positive for fatigue. HENT: Negative. Negative for congestion. Eyes: Negative. Respiratory: Positive for apnea and shortness of breath. Cardiovascular: Negative. Gastrointestinal: Negative. Endocrine: Negative. Genitourinary: Negative for frequency. Musculoskeletal: Negative. Skin: Negative. Allergic/Immunologic: Negative. Neurological: Negative for headaches. Hematological: Negative. Psychiatric/Behavioral: Positive for dysphoric mood. The patient is nervous/anxious. Objective:     Vitals:  Weight BMI Neck circumference    Wt Readings from Last 3 Encounters:   05/04/21 243 lb 12.8 oz (110.6 kg)   04/28/21 242 lb (109.8 kg)   04/26/21 243 lb (110.2 kg)    Body mass index is 34.98 kg/m².  Neck circumference: 18     BP HR SaO2   BP Readings from Last 3 Encounters:   05/04/21 112/84   04/28/21 138/70   04/26/21 122/84    Pulse Readings from Last 3 Encounters:   05/04/21 103   04/28/21 100   04/26/21 96    SpO2 Readings from Last 3 Encounters:   05/04/21 97%   04/28/21 98%   04/26/21 98%        The mandibular molar Class :   []1 [x]2 []3      Mallampati I Airway Classification:   []1 []2 []3 [x]4        Physical Exam  Vitals signs and nursing note severity of apnea and cautioned regarding driving or operating and dangerous equipment if the patient is experiencing daytime sleepiness. .    We discussed the proportionality between weight and AHI. With 10% weight change, the AHI has a 27% proportionate change. With 20% weight change, the AHI has a 45-50% proportionate change. Orders Placed This Encounter   Procedures    Home Sleep Study    Sleep Study with PAP Titration       Return in about 3 months (around 8/4/2021) for Reveiwing CPAP usage and compliance report and tro.     Alex Mgcregor MD  Medical Director [address] [place] [address]

## 2021-05-12 ENCOUNTER — TELEPHONE (OUTPATIENT)
Dept: INFECTIOUS DISEASES | Age: 36
End: 2021-05-12

## 2021-05-12 RX ORDER — ERGOCALCIFEROL 1.25 MG/1
50000 CAPSULE ORAL WEEKLY
Qty: 12 CAPSULE | Refills: 1 | Status: SHIPPED | OUTPATIENT
Start: 2021-05-12

## 2021-05-12 NOTE — TELEPHONE ENCOUNTER
Called pt --  Reviewed lab results  Hep B S ab pos  Start Dovato    Low vit D 9.8  Start ergocalciferol

## 2021-05-19 ENCOUNTER — TELEPHONE (OUTPATIENT)
Dept: INFECTIOUS DISEASES | Age: 36
End: 2021-05-19

## 2021-05-19 NOTE — TELEPHONE ENCOUNTER
Patient called to let you know that pharmacy did not have prescription for Dovato. They received Ergocalceferol only.    Patient is asking for Dr. Alberto Mckenzie to send prescription for [facility] [address] [phone] [facility] [phone]   [address] [place] [address], [address] [place] [address]   Phone:  [phone]  Fax:  [phone]

## 2021-05-25 ENCOUNTER — HOSPITAL ENCOUNTER (OUTPATIENT)
Dept: SLEEP CENTER | Age: 36
Discharge: HOME | End: 2021-05-25
Payer: COMMERCIAL

## 2021-05-25 DIAGNOSIS — J30.9 ALLERGIC RHINITIS, UNSPECIFIED SEASONALITY, UNSPECIFIED TRIGGER: ICD-10-CM

## 2021-05-25 DIAGNOSIS — G47.33 OBSTRUCTIVE SLEEP APNEA: ICD-10-CM

## 2021-05-25 DIAGNOSIS — E66.9 OBESITY (BMI 30.0-34.9): ICD-10-CM

## 2021-05-25 PROCEDURE — 95806 SLEEP STUDY UNATT&RESP EFFT: CPT

## 2021-06-15 ENCOUNTER — TELEPHONE (OUTPATIENT)
Dept: INFECTIOUS DISEASES | Age: 36
End: 2021-06-15

## 2021-08-10 ENCOUNTER — TELEPHONE (OUTPATIENT)
Dept: PULMONOLOGY | Age: 36
End: 2021-08-10

## 2021-08-10 NOTE — TELEPHONE ENCOUNTER
Sleep study showed moderate BYRON. AHI was 21  per hr. And O2 Desaturations to 85%. Dr Eliseo Lo titration.      Patient was transferred to sleep lab to get titration scheduled

## 2021-09-29 ENCOUNTER — HOSPITAL ENCOUNTER (OUTPATIENT)
Dept: SLEEP CENTER | Age: 36
Discharge: HOME | End: 2021-09-29
Payer: COMMERCIAL

## 2021-09-29 DIAGNOSIS — G47.33 OBSTRUCTIVE SLEEP APNEA: ICD-10-CM

## 2021-09-29 PROCEDURE — 95811 POLYSOM 6/>YRS CPAP 4/> PARM: CPT

## 2021-09-29 PROCEDURE — 95806 SLEEP STUDY UNATT&RESP EFFT: CPT

## 2021-09-30 PROCEDURE — 95811 POLYSOM 6/>YRS CPAP 4/> PARM: CPT | Performed by: PSYCHIATRY & NEUROLOGY

## 2021-09-30 NOTE — PROGRESS NOTES
Gail Nam         : 1985  [] [address] [facility]     [] [facility]      [x] Jose Daniel     [][facility]    [] [address]  [] [facility]   [] Other:  Diagnosis: [x] BYRON (G47.33) [] CSA (G47.31) [] Apnea (G47.30)   Length of Need: [] 12 Months [x] 99 Months [] Other:    Machine (DARLEEN!): [] Respironics Dream Station      Auto [x] ResMed AirSense     Auto [] Other:     [x]  CPAP () [] Bilevel ()   Mode: [x] Auto [] Spontaneous    Mode: [] Auto [] Spontaneous           14 cm                 Comfort Settings:   - Ramp Pressure: 5 cmH2O                                        - Ramp time: 15 min                                     -  Flex/EPR - 3 full time                                    - For ResMed Bilevel (TiMax-4 sec   TiMin- 0.2 sec)     Humidifier: [x] Heated ()        [x] Water chamber replacement ()/ 1 per 6 months        Mask:  Please always start with the mask the patient used during the titraion   [x] Nasal () /1 per 3 months [] Full Face () /1 per 3 months   [x] Patient choice -Size and fit mask [] Patient Choice - Size and fit mask   [] Dispense:   medium Nuance Pro nasal pillows  [] Dispense:    [x] Headgear () / 1 per 3 months [] Headgear () / 1 per 3 months   [x] Replacement Nasal Cushion ()/2 per month [] Interface Replacement ()/1 per month   [x] Replacement Nasal Pillows ()/2 per month         Tubing: [x] Heated ()/1 per 3 months    [] Standard ()/1 per 3 months [] Other:           Filters: [x] Non-disposable ()/1 per 6 months     [x] Ultra-Fine, Disposable ()/2 per month        Miscellaneous: [x] Chin Strap ()/ 1 per 6 months [] O2 bleed-in:       LPM   [] Oximetry on CPAP/Bilevel []  Other:          Start Order Date: 21    MEDICAL JUSTIFICATION:  I, the undersigned, certify that the above prescribed supplies are medically necessary for this patients wellbeing.   In my opinion, the supplies are both reasonable and necessary in reference to accepted standards of medicalpractice in treatment of this patients condition.     Tuan Soliz MD      NPI: [id number]       Order Signed Date: 09/30/21    Electronically signed by Tuan Soliz MD on 9/30/2021 at 1:44 PM

## 2021-10-05 ENCOUNTER — TELEPHONE (OUTPATIENT)
Dept: PULMONOLOGY | Age: 36
End: 2021-10-05

## 2021-10-05 NOTE — TELEPHONE ENCOUNTER
Sleep study showed moderate  BYRON. AHI was 21.9  per hr. And O2 Desaturations to 85%. Adequate control of the events at a CPAP pressure of 14 cm. Patient verbalized understanding with no further questions at this time. Nato Lawson will use [facility] for DME selection. Order faed to [facility].

## 2021-10-06 ENCOUNTER — TELEPHONE (OUTPATIENT)
Dept: PULMONOLOGY | Age: 36
End: 2021-10-06

## 2021-10-26 NOTE — TELEPHONE ENCOUNTER
Toi December calls back and will go with [address] Please fax order, etc to [address] Attention Fanta Rueda please set up in our office.

## 2022-07-14 ENCOUNTER — TELEPHONE (OUTPATIENT)
Dept: INFECTIOUS DISEASES | Age: 37
End: 2022-07-14

## 2022-07-14 DIAGNOSIS — B20 HUMAN IMMUNODEFICIENCY VIRUS (HCC): Primary | ICD-10-CM

## 2022-07-14 NOTE — TELEPHONE ENCOUNTER
LMOM with my direct number [phone] for pt to call    Pt needs appt with Dr Berenice Jordan and labs completed    Pt not seen since 4/2021

## 2022-07-18 NOTE — TELEPHONE ENCOUNTER
BERNARD (second) with my direct number [phone] for pt to call     Pt needs appt with Dr Rahel Patel and labs completed    Pt not seen since 4/2021

## 2022-07-25 NOTE — TELEPHONE ENCOUNTER
BONYOM (third) with my direct number [phone] for pt to call     Pt needs appt with Dr Timothy Swan and labs completed    Pt not seen since 4/2021

## 2022-10-08 ENCOUNTER — HOSPITAL ENCOUNTER (EMERGENCY)
Age: 37
Discharge: HOME | End: 2022-10-08
Attending: EMERGENCY MEDICINE
Payer: COMMERCIAL

## 2022-10-08 ENCOUNTER — APPOINTMENT (OUTPATIENT)
Dept: CT IMAGING | Age: 37
End: 2022-10-08
Payer: COMMERCIAL

## 2022-10-08 VITALS
WEIGHT: 258.82 LBS | RESPIRATION RATE: 16 BRPM | TEMPERATURE: 98.9 F | BODY MASS INDEX: 37.05 KG/M2 | HEART RATE: 95 BPM | HEIGHT: 70 IN | OXYGEN SATURATION: 99 % | DIASTOLIC BLOOD PRESSURE: 98 MMHG | SYSTOLIC BLOOD PRESSURE: 146 MMHG

## 2022-10-08 DIAGNOSIS — R10.9 LEFT FLANK PAIN: Primary | ICD-10-CM

## 2022-10-08 LAB
BILIRUBIN URINE: NEGATIVE
BLOOD, URINE: NEGATIVE
CLARITY: CLEAR
COLOR: YELLOW
GLUCOSE URINE: NEGATIVE MG/DL
KETONES, URINE: NEGATIVE MG/DL
LEUKOCYTE ESTERASE, URINE: NEGATIVE
MICROSCOPIC EXAMINATION: NORMAL
NITRITE, URINE: NEGATIVE
PH UA: 6.5 (ref 5–8)
PROTEIN UA: NEGATIVE MG/DL
SPECIFIC GRAVITY UA: 1.02 (ref 1–1.03)
URINE REFLEX TO CULTURE: NORMAL
URINE TYPE: NORMAL
UROBILINOGEN, URINE: 0.2 E.U./DL

## 2022-10-08 PROCEDURE — 74176 CT ABD & PELVIS W/O CONTRAST: CPT

## 2022-10-08 PROCEDURE — 81003 URINALYSIS AUTO W/O SCOPE: CPT

## 2022-10-08 PROCEDURE — 99284 EMERGENCY DEPT VISIT MOD MDM: CPT

## 2022-10-08 RX ORDER — IBUPROFEN 600 MG/1
600 TABLET ORAL 3 TIMES DAILY PRN
Qty: 30 TABLET | Refills: 0 | Status: SHIPPED | OUTPATIENT
Start: 2022-10-08

## 2022-10-08 ASSESSMENT — ENCOUNTER SYMPTOMS
ABDOMINAL PAIN: 0
CHEST TIGHTNESS: 0
RHINORRHEA: 0
NAUSEA: 0
WHEEZING: 0
COUGH: 0
SORE THROAT: 0
BACK PAIN: 1
SHORTNESS OF BREATH: 0
COLOR CHANGE: 0
VOMITING: 0
EYES NEGATIVE: 1
DIARRHEA: 0

## 2022-10-08 ASSESSMENT — LIFESTYLE VARIABLES
HOW MANY STANDARD DRINKS CONTAINING ALCOHOL DO YOU HAVE ON A TYPICAL DAY: 1 OR 2
HOW OFTEN DO YOU HAVE A DRINK CONTAINING ALCOHOL: MONTHLY OR LESS

## 2022-10-09 NOTE — ED PROVIDER NOTES
[facility] ENCOUNTER      Pt Name: Lobo Jung  MRN: [id number]  [place] 1985  Date ofevaluation: 10/8/2022  Provider: Christel Johnson MD    CHIEF COMPLAINT       Chief Complaint   Patient presents with    Flank Pain           HISTORY OF PRESENT ILLNESS   (Location/Symptom, Timing/Onset,Context/Setting, Quality, Duration, Modifying Factors, Severity)  Note limiting factors. Lobo Jung is a 30 y.o. male  who  has a past medical history of Strong-Parkinson-White syndrome. who presents to the emergency department    79-year-old male presents for 1 week of gradual onset constant unchanging left flank pain. It is nonradiating. Aching in nature does not radiate down into his abdomen does not move to his mid back. No urinary symptoms no fevers or chills. No other modifying factors nothing seems to make it better or worse. No other associated symptoms. Denies any fever, chills, nausea, vomiting, diarrhea, chest pain, shortness of breath, dysuria, hematuria, saddle anesthesia, bowel or bladder incontinence, gait ataxia, falls, syncope, numbness, weakness, neck pain. Only risk factor is that patient has a history of HIV but states that he is not take medication he does not know his viral load or his cell counts. No prior history of kidney stones. No known injury. Has not taken any medication for the pain. The history is provided by the patient. No  was used. NursingNotes were reviewed. REVIEW OF SYSTEMS    (2-9 systems for level 4, 10 or more for level 5)     Review of Systems   Constitutional: Negative. Negative for fatigue and fever. HENT:  Negative for congestion, rhinorrhea and sore throat. Eyes: Negative. Respiratory:  Negative for cough, chest tightness, shortness of breath and wheezing. Cardiovascular:  Negative for chest pain.    Gastrointestinal:  Negative for abdominal pain, diarrhea, nausea and vomiting. Genitourinary:  Positive for flank pain. Musculoskeletal:  Positive for back pain. Skin:  Negative for color change and rash. Allergic/Immunologic: Positive for immunocompromised state. Negative for environmental allergies. Neurological:  Negative for headaches. Hematological: Negative. All other systems reviewed and are negative. Except as noted above the remainder of the review of systems was reviewed and negative. PAST MEDICAL HISTORY     Past Medical History:   Diagnosis Date    Strong-Parkinson-White syndrome          SURGICALHISTORY       Past Surgical History:   Procedure Laterality Date    ABLATION OF DYSRHYTHMIC FOCUS           CURRENT MEDICATIONS       Previous Medications    DOLUTEGRAVIR-LAMIVUDINE  MG TABS    Take 1 tablet by mouth daily    FLUTICASONE (FLONASE) 50 MCG/ACT NASAL SPRAY    2 sprays by Each Nostril route daily    METHOCARBAMOL (ROBAXIN) 500 MG TABLET    Take 1 tablet by mouth every 8 hours as needed (back pain)    METHYLPREDNISOLONE (MEDROL DOSEPACK) 4 MG TABLET    Take by mouth.     VITAMIN D (ERGOCALCIFEROL) 1.25 MG (04745 UT) CAPS CAPSULE    Take 1 capsule by mouth once a week            No known allergies    FAMILY HISTORY       Family History   Problem Relation Age of Onset    Hypertension Mother     Cancer Mother     Heart Disease Mother     Diabetes Father     Stroke Father     Colon Cancer Maternal Grandfather     Colon Cancer Maternal Cousin           SOCIAL HISTORY       Social History     Socioeconomic History    Marital status: Single   Tobacco Use    Smoking status: Every Day     Packs/day: 0.25     Types: Cigars, Cigarettes    Smokeless tobacco: Current   Substance and Sexual Activity    Alcohol use: Yes     Comment: occassionally    Drug use: Yes     Types: Marijuana Fiorella Lozano)    Sexual activity: Yes     Partners: Female, Male       SCREENINGS             PHYSICAL EXAM    (up to 7 for level 4, 8 or more for level 5)     ED Triage Vitals   BP Temp Temp src Pulse Resp SpO2 Height Weight   -- -- -- -- -- -- -- --       Physical Exam  Vitals and nursing note reviewed. Constitutional:       General: He is not in acute distress. Appearance: Normal appearance. He is well-developed and normal weight. He is not ill-appearing, toxic-appearing or diaphoretic. HENT:      Head: Normocephalic and atraumatic. Right Ear: External ear normal.      Left Ear: External ear normal.      Mouth/Throat:      Mouth: Mucous membranes are moist.      Pharynx: Oropharynx is clear. Eyes:      Extraocular Movements: Extraocular movements intact. Cardiovascular:      Rate and Rhythm: Normal rate and regular rhythm. Pulses: Normal pulses. Heart sounds: Normal heart sounds. Pulmonary:      Effort: Pulmonary effort is normal.      Breath sounds: Normal breath sounds. No decreased breath sounds, wheezing, rhonchi or rales. Chest:      Chest wall: No tenderness. Abdominal:      General: Bowel sounds are normal.      Palpations: Abdomen is soft. Tenderness: There is no abdominal tenderness. There is no right CVA tenderness, left CVA tenderness, guarding or rebound. Musculoskeletal:         General: Normal range of motion. Cervical back: Normal range of motion and neck supple. No bony tenderness. Thoracic back: Tenderness present. No bony tenderness. Lumbar back: Tenderness present. No bony tenderness. Negative right straight leg raise test and negative left straight leg raise test.        Back:       Right lower leg: No edema. Left lower leg: No edema. Comments: Mild tenderness to the left flank with palpation as indicated above   Skin:     General: Skin is warm and dry. Capillary Refill: Capillary refill takes less than 2 seconds. Findings: No rash. Neurological:      General: No focal deficit present. Mental Status: He is alert and oriented to person, place, and time.    Psychiatric:         Mood and Affect: Mood normal.         Behavior: Behavior normal.       RESULTS     EKG: All EKG's are interpreted by the Emergency Department Physician who either signs or Co-signs this chart in the absence of a cardiologist.    RADIOLOGY:   Non-plain filmimages such as CT, Ultrasound and MRI are read by the radiologist.  All images reviewed by the emergency department physician who either signs or cosigns this chart. Interpretation per the Radiologist below, if available at the time ofthis note:    CT ABDOMEN PELVIS WO CONTRAST Additional Contrast? None   Final Result   No acute abnormality in the abdomen or pelvis. No evidence of renal calculi or obstructive uropathy. ED BEDSIDE ULTRASOUND:   Performed by ED Physician - none    LABS:  Labs Reviewed   URINALYSIS WITH REFLEX TO CULTURE       All other labs were within normal range or not returned as of this dictation. EMERGENCY DEPARTMENT COURSE and DIFFERENTIAL DIAGNOSIS/MDM:   Vitals:    Vitals:    10/08/22 2137   BP: (!) 146/98   Pulse: 95   Resp: 16   Temp: 98.9 °F (37.2 °C)   SpO2: 99%   Weight: 258 lb 13.1 oz (117.4 kg)   Height: 5' 10\" (1.778 m)       Patient was given thefollowing medications:  Medications - No data to display    ED COURSE & MEDICAL DECISION MAKING    Pertinent Labs & Imaging studies reviewed. (See chart for details)   -  Patient seen and evaluated in the emergency department. -  Triage and nursing notes reviewed and incorporated. -  Old chart records reviewed and incorporated. -  Differential diagnosis includes: Differential Diagnosis: Kidney Stone, Pyelonephritis, Renal Artery Aneurysm,  Abdominal Aortic Aneurysm, Metastases to back, Mechanical Back Pain, Cauda Equina Syndrome    62-year-old male who presents for left flank pain. Slightly reproducible with palpation but not true CVA tenderness. Potentially musculoskeletal and/or mechanical back pain. No midline spinal tenderness.   No red flag symptoms no concern for spinal epidural abscess cauda equina syndrome. No urinary symptoms however patient does have a history of untreated HIV. He is afebrile nontachycardic saturating well on room air. No signs of systemic sepsis. Normotensive. Will obtain urine studies as well as CT abdomen pelvis without contrast.  Will reeval closely disposition accordingly. -  Work-up included:  See above  -  ED treatment included: See above  -  Results discussed with patient. The patient is agreeable with plan of care and disposition. Is this patient to be included in the SEP-1 Core Measure due to severe sepsis or septic shock? No   Exclusion criteria - the patient is NOT to be included for SEP-1 Core Measure due to:  2+ SIRS criteria are not met     REASSESSMENT      The patient is at low risk for mortality based on demographic, history and clinical factors. Given the best available information and clinical assessment, I estimate the risk of hospitalization to be greater than risk of treatment at home. I have explained to the patient that the risk could rapidly change, given precautions for return and instructions. Explained to patient that the risk for mortality is low based on demographic, history and clinical factors. I discussed with patient the results of evaluation in the ED, diagnosis, care, and prognosis. The plan is to discharge to home. Patient is in agreement with plan and questions have been answered. I also discussed with patient the reasons which may require a return visit and the importance of follow-up care. The patient is well-appearing, nontoxic, and improved at the time of discharge. Patient agrees to call to arrange follow-up care as directed. Patient understands to return immediately for worsening/change in symptoms. CRITICAL CARE TIME   Total Critical Care time was 15 minutes, excluding separately reportable procedures.   There was a high probability of clinically significant/life threatening deterioration in the patient's condition which required my urgent intervention. This includes multiple reevaluations, vital sign monitoring, pulse oximetry monitoring, telemetry monitoring, clinical response to the IV medications, reviewing the nursing notes, consultation time, dictation/documentation time, and interpretation of the labwork. (This time excludes time spent performing procedures). CONSULTS:  None    PROCEDURES:  Unless otherwise noted below, none     Procedures    FINAL IMPRESSION      1.  Left flank pain          DISPOSITION/PLAN   DISPOSITION Decision To Discharge 10/08/2022 10:39:35 PM      PATIENT REFERREDTO:  JUDD Kingston - CNP  [facility] [address] [facility]  [phone]    Schedule an appointment as soon as possible for a visit       DISCHARGEMEDICATIONS:  New Prescriptions    IBUPROFEN (ADVIL;MOTRIN) 600 MG TABLET    Take 1 tablet by mouth 3 times daily as needed for Pain          (Please note that portions of this note were completed with a voice recognition program.  Efforts were made to edit the dictations but occasionally words are mis-transcribed.)    Christel Johnson MD (electronically signed)  Attending Emergency Physician         Christel Johnson MD  10/08/22 5556

## 2022-11-26 ENCOUNTER — HOSPITAL ENCOUNTER (EMERGENCY)
Age: 37
Discharge: HOME | End: 2022-11-26

## 2022-11-26 VITALS
HEIGHT: 70 IN | HEART RATE: 89 BPM | BODY MASS INDEX: 35.29 KG/M2 | DIASTOLIC BLOOD PRESSURE: 87 MMHG | SYSTOLIC BLOOD PRESSURE: 141 MMHG | RESPIRATION RATE: 18 BRPM | OXYGEN SATURATION: 99 % | WEIGHT: 246.47 LBS | TEMPERATURE: 98.4 F

## 2022-11-26 DIAGNOSIS — K30 STOMACH UPSET: Primary | ICD-10-CM

## 2022-11-26 PROCEDURE — 99282 EMERGENCY DEPT VISIT SF MDM: CPT

## 2022-11-26 NOTE — Clinical Note
Kj Solis was seen and treated in our emergency department on 11/26/2022. He may return to work on 11/27/2022. As long as symptoms are still well resolved and no fever occurs within 24 hours of discharge or shift he may return to work tomorrow. If you have any questions or concerns, please don't hesitate to call.       Jose Armando Ahumada PA-C

## 2022-11-27 NOTE — ED PROVIDER NOTES
**ADVANCED PRACTICE PROVIDER, I HAVE EVALUATED THIS PATIENT**        [address] ENCOUNTER      Pt Name: Lilia Ballard  NAF:[id number]  [place] 1985  Date of evaluation: 11/26/2022  Provider: Lossie Kocher, PA-C  Note Started: 8:06 PM EST 11/26/2022        Chief Complaint:    Chief Complaint   Patient presents with    Letter for School/Work     Pt states he had n/v/d yesterday, symptoms resolved today, but states he needs letter for employer to return to work. Nursing Notes, Past Medical Hx, Past Surgical Hx, Social Hx, Allergies, and Family Hx were all reviewed and agreed with or any disagreements were addressed in the HPI.    HPI: (Location, Duration, Timing, Severity, Quality, Assoc Sx, Context, Modifying factors)    Chief Complaint of feeling bad yesterday afternoon into this morning but now well throughout the day and wanting to return to work tomorrow    This is a  30 y.o. male who presents indicating that he is not had any fevers or chills at all throughout. He states that he does not have any ongoing abdominal issues or previous abdominal surgery or daily medications he takes for anything. He states that he was actually feeling pretty well yesterday and working. However in the afternoon suddenly he had the need to vomit and then did have to leave work early. He states that he did vomit a number of times through the evening yesterday and diarrhea at least a couple of times including this morning. However since then he has been eating little bits of food and drinking some liquids and states he really does not have any abdominal pain. No vomiting or diarrhea in the last several hours. No fevers at home or body aches and otherwise feels pretty well. He does need a work note to go back to work.     PastMedical/Surgical History:      Diagnosis Date    Strong-Parkinson-White syndrome          Procedure Laterality Date    ABLATION OF DYSRHYTHMIC FOCUS         Medications:  Previous Medications    DOLUTEGRAVIR-LAMIVUDINE  MG TABS    Take 1 tablet by mouth daily    FLUTICASONE (FLONASE) 50 MCG/ACT NASAL SPRAY    2 sprays by Each Nostril route daily    IBUPROFEN (ADVIL;MOTRIN) 600 MG TABLET    Take 1 tablet by mouth 3 times daily as needed for Pain    METHOCARBAMOL (ROBAXIN) 500 MG TABLET    Take 1 tablet by mouth every 8 hours as needed (back pain)    METHYLPREDNISOLONE (MEDROL DOSEPACK) 4 MG TABLET    Take by mouth. VITAMIN D (ERGOCALCIFEROL) 1.25 MG (46148 UT) CAPS CAPSULE    Take 1 capsule by mouth once a week         Review of Systems:  (2-9 systems needed)  Review of Systems  Positive history as above with no vomiting or diarrhea in the last several hours according to patient, no headache or vision change neck pain or stiffness shortness of breath or chest pain. No runny or stuffy nose or abdominal distention. No blood in stool or urine. No extremity acute weakness or rash. \"Positives and Pertinent negatives as per HPI\"    Physical Exam:  Physical Exam  Vitals and nursing note reviewed. Constitutional:       Appearance: Normal appearance. He is not ill-appearing, toxic-appearing or diaphoretic. HENT:      Head: Normocephalic and atraumatic. Right Ear: External ear normal.      Left Ear: External ear normal.      Nose: Nose normal.      Mouth/Throat:      Mouth: Mucous membranes are moist.      Comments: Gag reflex intact  Eyes:      General:         Right eye: No discharge. Left eye: No discharge. Conjunctiva/sclera: Conjunctivae normal.   Cardiovascular:      Rate and Rhythm: Normal rate and regular rhythm. Pulses: Normal pulses. Heart sounds: Normal heart sounds. No murmur heard. No gallop. Pulmonary:      Effort: Pulmonary effort is normal. No respiratory distress. Breath sounds: Normal breath sounds. No wheezing, rhonchi or rales.    Abdominal:      General: Bowel sounds are normal. There is no distension. Palpations: Abdomen is soft. Tenderness: There is no abdominal tenderness. There is no right CVA tenderness or left CVA tenderness. Musculoskeletal:         General: Normal range of motion. Cervical back: Normal range of motion and neck supple. Skin:     General: Skin is warm and dry. Capillary Refill: Capillary refill takes less than 2 seconds. Neurological:      Mental Status: He is alert and oriented to person, place, and time. Mental status is at baseline. Motor: No weakness. Psychiatric:         Mood and Affect: Mood normal.         Behavior: Behavior normal.       MEDICAL DECISION MAKING    Vitals:    Vitals:    11/26/22 1919   BP: (!) 141/98   Pulse: 90   Resp: 16   Temp: 98.4 °F (36.9 °C)   TempSrc: Oral   SpO2: 99%   Weight: 246 lb 7.6 oz (111.8 kg)   Height: 5' 10\" (1.778 m)       LABS:Labs Reviewed - No data to display         RADIOLOGY:   Non-plain film images such as CT, Ultrasound and MRI are read by the radiologist. Hua Alexis PA-C have directly visualized the radiologic plain film image(s) with the below findings:      Interpretation per the Radiologist below, if available at the time of this note:    No orders to display        No results found. MEDICAL DECISION MAKING / ED COURSE:      PROCEDURES:   Procedures    None    Patient was given:  Medications - No data to display    This patient presents as above and evaluation and treatment are begun here. Patient's vital signs are stable. He indicates the history as above and states that really he is feeling like his symptoms have resolved through the day and he has been able to tolerate both eating and drinking. No diarrhea in the last several hours. Abdomen benign on exam and patient has no fever, is breathing easily, no indication of acute system compromise. Conservative home care now discussed and agreed upon with patient including the following discharge home instructions.   Patient would like a work note and this is written for him. Home in stable condition to continue taking small amounts of liquids frequently and monitor for continued improvement. Advance activity as tolerated. Follow-up outpatient for recheck with your family doctor in a few days if needed. Return to the ER for any emergency worsening or concern. The patient tolerated their visit well. I evaluated the patient. The physician was available for consultation as needed. The patient and / or the family were informed of the results of any tests, a time was given to answer questions, a plan was proposed and they agreed with plan. I am the Primary Clinician of Record. CLINICAL IMPRESSION:  1.  Stomach upset        DISPOSITION Decision To Discharge 11/26/2022 08:04:11 PM      PATIENT REFERRED TO:  JUDD Azul [place] [address]  [phone]    Call   As needed      DISCHARGE MEDICATIONS:  New Prescriptions    No medications on file       DISCONTINUED MEDICATIONS:  Discontinued Medications    No medications on file              (Please note the MDM and HPI sections of this note were completed with a voice recognition program.  Efforts were made to edit the dictations but occasionally words are mis-transcribed.)    Electronically signed, Agustin Lebron PA-C,          Agustin Lebron PA-C  11/26/22 2009

## 2022-11-27 NOTE — DISCHARGE INSTRUCTIONS
Home in stable condition to continue taking small amounts of liquids frequently and monitor for continued improvement. Advance activity as tolerated. Follow-up outpatient for recheck with your family doctor in a few days if needed. Return to the ER for any emergency worsening or concern.

## 2022-12-15 ENCOUNTER — TELEPHONE (OUTPATIENT)
Dept: PULMONOLOGY | Age: 37
End: 2022-12-15

## 2022-12-15 NOTE — TELEPHONE ENCOUNTER
[facility]CO patient and gave him the information. Once he gets his medicaid taken care of he will call the office for an appointment.

## 2022-12-15 NOTE — TELEPHONE ENCOUNTER
Candido Crew calls. Was informed by his PCP needed to be using a cpap machine. LOV: 5/4/2021  HST: 5/25/2021  Titration: 9/29/2021  AHI 21.9. orders were sent to [facility]. Candido Khalilw never followed through in getting a machine back in 2021. Stated \" I was just to lazy, concerned now as his blood pressure is high. \"  Currently has no insurance and is applying for Medicaid. Dr. Omid Maza, other than needing an appointment will Candido Tapia need to repeat sleep studies?

## 2023-01-17 DIAGNOSIS — R80.9 PROTEINURIA, UNSPECIFIED TYPE: ICD-10-CM

## 2023-01-17 LAB
PROTEIN PROTEIN: 0.02 G/DL
PROTEIN PROTEIN: 21 MG/DL

## 2023-01-18 LAB — URINE ELECTROPHORESIS INTERP: NORMAL

## 2023-02-23 ENCOUNTER — OFFICE VISIT (OUTPATIENT)
Dept: INFECTIOUS DISEASES | Age: 38
End: 2023-02-23

## 2023-02-23 VITALS
TEMPERATURE: 96.1 F | OXYGEN SATURATION: 96 % | WEIGHT: 246.2 LBS | HEART RATE: 107 BPM | BODY MASS INDEX: 35.24 KG/M2 | HEIGHT: 70 IN | DIASTOLIC BLOOD PRESSURE: 77 MMHG | SYSTOLIC BLOOD PRESSURE: 128 MMHG

## 2023-02-23 DIAGNOSIS — B20 HUMAN IMMUNODEFICIENCY VIRUS (HCC): Primary | ICD-10-CM

## 2023-02-23 PROCEDURE — 99214 OFFICE O/P EST MOD 30 MIN: CPT | Performed by: INTERNAL MEDICINE

## 2023-02-23 NOTE — PROGRESS NOTES
Infectious Diseases HIV Outpatient Note    HIV history:   Dx 3/15/21 at [address] [place] [address], initial     Primary Care Physician:  Thomas Blanca, APRN - CNP  Initial Visit:   4/8/21  History Obtained From:   Patient    CHIEF COMPLAINT:    Chief Complaint   Patient presents with    Follow-up     ART -nk       HISTORY OF PRESENT ILLNESS / Interval history:      4/8/21 New pt visit - new HIV dx  Pt seen at [address] [place] [address] ED with rectal pain on 3/15, had test for STD, HIV - HIV test reactive. Seen in ED [facility] 3/20, rx Akil Orourke, referred to me  Seen by PCP 3/25  Seen by Psychologist 3/30    Pt reports he was surprised, upset, at time tearful regarding dx.  'did not expect it'  Has had no sx or related conditions (worse skin problem, shingles)    Sexual History:    Sexual relations with both men / woman. Pt reports 1 partners in last 1 mo, 2 in last 3 mo, 5 in last 6 mo, hx unprotected intercourse, hx receptive and insertive anal intercourse. Hx sexual encounter with HIV + person. No hx anonymous partner. No hx payment / drugs for sex. Hx STD (GC or chlamydia). No hx syphilis. Last neg HIV test - 2010 4/28/21 Follow-up visit  Pt doing well. Less 'upset' with diagnosis. Reviewed labs rio CD4/ VL. Discussed meds, starting meds, adherence    2/23/23 Follow-up visit, NOT seen in 1 yr 10 mo  Pt feeling good  Has been off meds since approx 4/2022, ran out of insurance and stopped    Updated sexual History:    Sexual relations with both men / woman. Pt reports 0 in last 6 mo, hx unprotected intercourse, hx receptive and insertive anal intercourse. Hx sexual encounter with HIV + person. No hx anonymous partner. No hx payment / drugs for sex. Hx STD (GC or chlamydia). No hx syphilis.         Past Medical History:    Past Medical History:   Diagnosis Date    Strong-Parkinson-White syndrome        Past Surgical History:    Past Surgical History:   Procedure Laterality Date    ABLATION OF DYSRHYTHMIC FOCUS         Current Medications:    Current Outpatient Medications   Medication Sig Dispense Refill    lisinopril (PRINIVIL;ZESTRIL) 10 MG tablet Take 1 tablet by mouth daily 90 tablet 0    amLODIPine (NORVASC) 5 MG tablet Take 1 tablet by mouth daily 90 tablet 0    ibuprofen (ADVIL;MOTRIN) 600 MG tablet Take 1 tablet by mouth 3 times daily as needed for Pain 30 tablet 0    Dolutegravir-lamiVUDine  MG TABS Take 1 tablet by mouth daily 30 tablet 5    methocarbamol (ROBAXIN) 500 MG tablet Take 1 tablet by mouth every 8 hours as needed (back pain) 30 tablet 0    vitamin D (ERGOCALCIFEROL) 1.25 MG (05084 UT) CAPS capsule Take 1 capsule by mouth once a week 12 capsule 1    fluticasone (FLONASE) 50 MCG/ACT nasal spray 2 sprays by Each Nostril route daily 1 Bottle 5     No current facility-administered medications for this visit. 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 No weight loss. No visual change, eye pain, eye discharge. No oral lesion, sore throat, dysphagia. Denies cough / sputum. Denies chest pain, palpitations. Denies n / v / abd pain. No diarrhea. Denies dysuria or change in urinary function. Denies joint swelling or pain. No myalgia, arthralgia. Denies skin changes, rash, itching. Denies focal weakness, sensory change or other neurologic symptom. Denies new depression, other psychiatric problem  No lymph node swelling or tenderness. No symptoms endocrine disorder.   No symptoms hematologic disorder    PHYSICAL EXAM:    Vitals:    /77 (Site: Left Upper Arm, Position: Sitting, Cuff Size: Large Adult)   Pulse (!) 107   Temp (!) 96.1 °F (35.6 °C) (Temporal)   Ht 5' 10\" (1.778 m)   Wt 246 lb 3.2 oz (111.7 kg)   SpO2 96%   BMI 35.33 kg/m²     GENERAL: No apparent distress. HEENT: Membranes moist, no oral lesion  NECK:  Supple  LYMPH: No adenopathy   LUNGS: Clear b/l, no rales, no dullness  CARDIAC: RRR, no murmur appreciated  ABD:  + BS, soft / NT  :  See 4/8/21  EXT:  No rash, no edema, no lesions  NEURO: No focal neurologic findings  PSYCH: Orientation, sensorium, mood normal    DATA:    See EPIC      IMPRESSION    HIV Health Maintenance:  HIV:  Diagnosis / RF 3/15/21 / MSM   HIV genotype  4/8/21 R sustiva / neviripine  HLA      CD4    4/8/21 336 (20%)  HIV VL   4/8/21 19,119  Therapy  4/28/21 Dovato     Vaccines:   Pneumovax       Prevnar     Meningococcal vax     Flu vax      HAV vax  4/28/21 HAV ab neg -   HBV vax   4/28/21 HBV S ab pos - immune   Tdap    Td 2/23/10      Labs   G6PD   4/8/21- 13.9 (normal)   Syphilis antibody 4/8/21NR      PPD    4/8/21 neg     HCV    4/8/21 non-reactive    Lipid panel   4/8/21 - chol 128, HDL 21   Vit D 25-OH   4/2821 - 9.6     / STD  Exam    4/8/21     GC/CT   4/8/21 urethral + rectal - neg / neg   Anal PAP/HPV 4/8/21 neg / neg    Referrals:  [facility]              PROBLEM LIST:  HIV - dx 3/15/21  Anal condyloma  Obesity - BMI 34.7  Adjustment d/o - reaction to new dx    RECOMMENDATIONS:      Call [facility] to enrolled in [facility] / or explore eligibility for other insurance for care  Off ART - will start as soon as pt has coverage  Obtain labs  after has coverage - CBC c diff, CMP, CD4 / CD8 lymphocyte subset panel, HIV VL    3.  HIV care: HIV database / forms: see above    - Baseline labs - see above   - ART - have not started   - Vaccines - see above; address next visit    - Anal cancer screen -  exam with rectal cytology 4/8/21 - neg / neg   - HIV counseling: discussed disclosure of dx, safe sexual practices. Discussed revealing dx to family, others   4. RHCM   - Diet - discussed   - Exercise - discussed    - Mental health - discussed   - Substance use: EtOH - 'seldom', no drugs    Return in 2 - 4 week   Obtain labs, start on ART, give recommended vaccinations     - Spent 45 minutes on visit  (including history, physical exam, review of data, development and implementation of treatment plan and coordination of care.)   - Over 50% of time spent in pt counseling and education.     Garry Bamberger, MD

## 2023-02-27 ENCOUNTER — TELEPHONE (OUTPATIENT)
Dept: INFECTIOUS DISEASES | Age: 38
End: 2023-02-27

## 2023-02-27 NOTE — TELEPHONE ENCOUNTER
Pt has appt with Rakel Adames at [facility] for Intake on Friday 3/4/23 at 10am  Pt is a great referral for the program

## 2023-02-27 NOTE — TELEPHONE ENCOUNTER
----- Message from Merari Ge RN sent at 2/24/2023 11:33 AM EST -----  Call Ani  Has pt reached out  What needs to happen for coverage?   Pt currently not on ART

## 2023-03-08 NOTE — TELEPHONE ENCOUNTER
Received call from Jane  Pt did not show for his Appt on Friday  Pt has not started medications  Pt appt on 3/15 will need to be rescheduled to a later date AFTER he has start medication and is able to have lab work completed

## 2023-03-08 NOTE — TELEPHONE ENCOUNTER
Left message for Maxine HAYWARD call for Appt last week with pt  If pt has not been able to start medications; will need to reschedule his appt until he has been able to start medications    Left message for pt to return my call to discuss meds  My name, number, Dr Aubrey Solis name

## 2023-03-10 NOTE — TELEPHONE ENCOUNTER
2 attempts to contact pt have resulted in call going straight to [facility] stating my name, number, Dr Vigil's name and need to reschedule his appt AFTER he starts MEDS and has LABS completed  Pt is scheduled for 3/15/23 - appt is going to be cancelled

## 2023-03-13 NOTE — TELEPHONE ENCOUNTER
Spoke with pt   Explained to him once he starts his medication (Dovato) to contact this office to schedule appt. Once the appt is made pt is to have labs done prior to the appt. Pt verbalized understanding    Pt stated he missed his appt with Iman Lopez at [facility] because he was sick. Pt stated he reach out to Iman Lopez and she stated she needed test results before they can give the med.     I have reached out to Iman Lopez to ask what is needed  Awaiting call back

## 2023-03-13 NOTE — TELEPHONE ENCOUNTER
Spoke with Iman Lopez at [facility] and she is meeting with pt today at 3.   Lab results on file faxed to [address] [place] [address] Ave.  Confirmation in chart

## 2023-04-04 ENCOUNTER — TELEPHONE (OUTPATIENT)
Dept: INFECTIOUS DISEASES | Age: 38
End: 2023-04-04

## 2023-04-04 DIAGNOSIS — B20 HUMAN IMMUNODEFICIENCY VIRUS (HCC): Primary | ICD-10-CM

## 2023-04-04 RX ORDER — ERGOCALCIFEROL 1.25 MG/1
50000 CAPSULE ORAL WEEKLY
Qty: 12 CAPSULE | Refills: 1 | Status: SHIPPED | OUTPATIENT
Start: 2023-04-04

## 2023-04-04 NOTE — TELEPHONE ENCOUNTER
Called pt -  He reports he picked up med / Bretta Sep and is starting today   He is not taking Vit D ergocalciferol  No complaint     REC/  Take Dovato, adherence  Take Ergocalciferol weekly, will order  Go to lab 1 week prior to next visit  F/u 5/11 as scheduled

## 2023-05-11 ENCOUNTER — OFFICE VISIT (OUTPATIENT)
Dept: INFECTIOUS DISEASES | Age: 38
End: 2023-05-11

## 2023-05-11 VITALS
OXYGEN SATURATION: 98 % | BODY MASS INDEX: 34.72 KG/M2 | SYSTOLIC BLOOD PRESSURE: 124 MMHG | WEIGHT: 242 LBS | DIASTOLIC BLOOD PRESSURE: 83 MMHG | HEART RATE: 84 BPM

## 2023-05-11 DIAGNOSIS — E66.9 CLASS 1 OBESITY WITHOUT SERIOUS COMORBIDITY WITH BODY MASS INDEX (BMI) OF 34.0 TO 34.9 IN ADULT, UNSPECIFIED OBESITY TYPE: ICD-10-CM

## 2023-05-11 DIAGNOSIS — B20 HUMAN IMMUNODEFICIENCY VIRUS (HCC): Primary | ICD-10-CM

## 2023-05-11 DIAGNOSIS — B20 HUMAN IMMUNODEFICIENCY VIRUS (HCC): ICD-10-CM

## 2023-05-11 PROCEDURE — 90472 IMMUNIZATION ADMIN EACH ADD: CPT | Performed by: INTERNAL MEDICINE

## 2023-05-11 PROCEDURE — 90632 HEPA VACCINE ADULT IM: CPT | Performed by: INTERNAL MEDICINE

## 2023-05-11 PROCEDURE — 90715 TDAP VACCINE 7 YRS/> IM: CPT | Performed by: INTERNAL MEDICINE

## 2023-05-11 PROCEDURE — 90670 PCV13 VACCINE IM: CPT | Performed by: INTERNAL MEDICINE

## 2023-05-11 PROCEDURE — 99215 OFFICE O/P EST HI 40 MIN: CPT | Performed by: INTERNAL MEDICINE

## 2023-05-11 PROCEDURE — 90734 MENACWYD/MENACWYCRM VACC IM: CPT | Performed by: INTERNAL MEDICINE

## 2023-05-11 PROCEDURE — 90471 IMMUNIZATION ADMIN: CPT | Performed by: INTERNAL MEDICINE

## 2023-05-11 NOTE — PROGRESS NOTES
Infectious Diseases HIV Outpatient Note    HIV history:   Dx 3/15/21 at [address] [place] [address], initial     Primary Care Physician:  Win Mar, APRN - CNP  Initial Visit:   4/8/21  History Obtained From:   Patient    CHIEF COMPLAINT:    Chief Complaint   Patient presents with    Follow-up     Art taking meds Dovato using Gwenith  is CM  Labs done today       HISTORY OF PRESENT ILLNESS / Interval history:      4/8/21 New pt visit - new HIV dx  Pt seen at [address] [place] [address] ED with rectal pain on 3/15, had test for STD, HIV - HIV test reactive. Seen in ED [facility] 3/20, rx Faviola Peres, referred to me  Seen by PCP 3/25  Seen by Psychologist 3/30    Pt reports he was surprised, upset, at time tearful regarding dx.  'did not expect it'  Has had no sx or related conditions (worse skin problem, shingles)    Sexual History:    Sexual relations with both men / woman. Pt reports 1 partners in last 1 mo, 2 in last 3 mo, 5 in last 6 mo, hx unprotected intercourse, hx receptive and insertive anal intercourse. Hx sexual encounter with HIV + person. No hx anonymous partner. No hx payment / drugs for sex. Hx STD (GC or chlamydia). No hx syphilis. Last neg HIV test - 2010 4/28/21 Follow-up visit  Pt doing well. Less 'upset' with diagnosis. Reviewed labs rio CD4/ VL. Discussed meds, starting meds, adherence    2/23/23 Follow-up visit, NOT SEEN IN 1 yr 10 mo  Pt feeling good  Has been off meds since approx 4/2022, ran out of insurance and stopped    Updated sexual History:    Sexual relations with both men / woman. Pt reports 0 in last 6 mo, hx unprotected intercourse, hx receptive and insertive anal intercourse. Hx sexual encounter with HIV + person. No hx anonymous partner. No hx payment / drugs for sex. Hx STD (GC or chlamydia).   No hx

## 2023-05-12 LAB
25(OH)D3 SERPL-MCNC: 27.1 NG/ML
ALBUMIN SERPL-MCNC: 4.5 G/DL (ref 3.4–5)
ALBUMIN/GLOB SERPL: 1.4 {RATIO} (ref 1.1–2.2)
ALP SERPL-CCNC: 68 U/L (ref 40–129)
ALT SERPL-CCNC: 11 U/L (ref 10–40)
ANION GAP SERPL CALCULATED.3IONS-SCNC: 11 MMOL/L (ref 3–16)
AST SERPL-CCNC: 14 U/L (ref 15–37)
BASOPHILS # BLD: 0 K/UL (ref 0–0.2)
BASOPHILS NFR BLD: 0.5 %
BILIRUB SERPL-MCNC: 0.3 MG/DL (ref 0–1)
BUN SERPL-MCNC: 15 MG/DL (ref 7–20)
CALCIUM SERPL-MCNC: 9.3 MG/DL (ref 8.3–10.6)
CHLORIDE SERPL-SCNC: 102 MMOL/L (ref 99–110)
CO2 SERPL-SCNC: 24 MMOL/L (ref 21–32)
CREAT SERPL-MCNC: 0.9 MG/DL (ref 0.9–1.3)
DEPRECATED RDW RBC AUTO: 14.9 % (ref 12.4–15.4)
EOSINOPHIL # BLD: 0.1 K/UL (ref 0–0.6)
EOSINOPHIL NFR BLD: 1.9 %
GFR SERPLBLD CREATININE-BSD FMLA CKD-EPI: >60 ML/MIN/{1.73_M2}
GLUCOSE SERPL-MCNC: 89 MG/DL (ref 70–99)
HCT VFR BLD AUTO: 41 % (ref 40.5–52.5)
HGB BLD-MCNC: 13.3 G/DL (ref 13.5–17.5)
LYMPHOCYTES # BLD: 2.2 K/UL (ref 1–5.1)
LYMPHOCYTES NFR BLD: 32.4 %
MCH RBC QN AUTO: 29.3 PG (ref 26–34)
MCHC RBC AUTO-ENTMCNC: 32.5 G/DL (ref 31–36)
MCV RBC AUTO: 90 FL (ref 80–100)
MONOCYTES # BLD: 0.8 K/UL (ref 0–1.3)
MONOCYTES NFR BLD: 11.7 %
NEUTROPHILS # BLD: 3.7 K/UL (ref 1.7–7.7)
NEUTROPHILS NFR BLD: 53.5 %
PLATELET # BLD AUTO: 304 K/UL (ref 135–450)
PMV BLD AUTO: 7.1 FL (ref 5–10.5)
POTASSIUM SERPL-SCNC: 3.7 MMOL/L (ref 3.5–5.1)
PROT SERPL-MCNC: 7.8 G/DL (ref 6.4–8.2)
RBC # BLD AUTO: 4.56 M/UL (ref 4.2–5.9)
SODIUM SERPL-SCNC: 137 MMOL/L (ref 136–145)
WBC # BLD AUTO: 6.8 K/UL (ref 4–11)

## 2023-05-13 LAB
HIV-1 QNT LOG, IU/ML: <1.47 LOG CPY/ML
HIV-1 QNT, IU/ML: ABNORMAL CPY/ML
INTERPRETATION: DETECTED

## 2023-05-17 ENCOUNTER — TELEPHONE (OUTPATIENT)
Dept: INFECTIOUS DISEASES | Age: 38
End: 2023-05-17

## 2023-05-17 NOTE — TELEPHONE ENCOUNTER
----- Message from Sanford Cervantes MD sent at 5/17/2023  8:22 AM EDT -----  Call pt -  CD4 359, VL <30, liver / kidney / CBC normal

## 2023-05-17 NOTE — TELEPHONE ENCOUNTER
LMOM  Stated labs look great  Numbers for Dr Elba Hatfield were we like to see them and he is undetectable  Kidney, Liver and blood cell counts good  Call with any questions  [phone]  I did add I was Out of office Thursday and Friday this week

## 2023-07-12 NOTE — ED TRIAGE NOTES
Patient presents to ED complaining of recent nausea and emesis. Patient states symptoms resolved today, but states he needs letter for employer to return to work. Denies fever, denies other symptoms. Patient resting on bed, respirations even and easy at this time. No obvious distress.
97

## 2023-09-20 DIAGNOSIS — B20 HUMAN IMMUNODEFICIENCY VIRUS (HCC): Primary | ICD-10-CM

## 2023-10-05 ENCOUNTER — OFFICE VISIT (OUTPATIENT)
Dept: INFECTIOUS DISEASES | Age: 38
End: 2023-10-05

## 2023-10-05 VITALS
HEART RATE: 68 BPM | WEIGHT: 234 LBS | SYSTOLIC BLOOD PRESSURE: 137 MMHG | DIASTOLIC BLOOD PRESSURE: 89 MMHG | OXYGEN SATURATION: 98 % | BODY MASS INDEX: 33.58 KG/M2

## 2023-10-05 DIAGNOSIS — B20 HUMAN IMMUNODEFICIENCY VIRUS (HCC): ICD-10-CM

## 2023-10-05 DIAGNOSIS — E66.9 CLASS 1 OBESITY WITHOUT SERIOUS COMORBIDITY WITH BODY MASS INDEX (BMI) OF 34.0 TO 34.9 IN ADULT, UNSPECIFIED OBESITY TYPE: ICD-10-CM

## 2023-10-05 DIAGNOSIS — F32.A DEPRESSION, UNSPECIFIED DEPRESSION TYPE: ICD-10-CM

## 2023-10-05 DIAGNOSIS — B20 HUMAN IMMUNODEFICIENCY VIRUS (HCC): Primary | ICD-10-CM

## 2023-10-05 LAB
25(OH)D3 SERPL-MCNC: 19.8 NG/ML
ALBUMIN SERPL-MCNC: 4.6 G/DL (ref 3.4–5)
ALBUMIN/GLOB SERPL: 1.4 {RATIO} (ref 1.1–2.2)
ALP SERPL-CCNC: 82 U/L (ref 40–129)
ALT SERPL-CCNC: 12 U/L (ref 10–40)
ANION GAP SERPL CALCULATED.3IONS-SCNC: 9 MMOL/L (ref 3–16)
AST SERPL-CCNC: 13 U/L (ref 15–37)
BASOPHILS # BLD: 0 K/UL (ref 0–0.2)
BASOPHILS NFR BLD: 0.3 %
BILIRUB SERPL-MCNC: 0.5 MG/DL (ref 0–1)
BUN SERPL-MCNC: 9 MG/DL (ref 7–20)
CALCIUM SERPL-MCNC: 9 MG/DL (ref 8.3–10.6)
CHLORIDE SERPL-SCNC: 103 MMOL/L (ref 99–110)
CO2 SERPL-SCNC: 27 MMOL/L (ref 21–32)
CREAT SERPL-MCNC: 0.8 MG/DL (ref 0.9–1.3)
DEPRECATED RDW RBC AUTO: 14.1 % (ref 12.4–15.4)
EOSINOPHIL # BLD: 0.2 K/UL (ref 0–0.6)
EOSINOPHIL NFR BLD: 1.9 %
GFR SERPLBLD CREATININE-BSD FMLA CKD-EPI: >60 ML/MIN/{1.73_M2}
GLUCOSE SERPL-MCNC: 85 MG/DL (ref 70–99)
HCT VFR BLD AUTO: 41.7 % (ref 40.5–52.5)
HGB BLD-MCNC: 14 G/DL (ref 13.5–17.5)
LYMPHOCYTES # BLD: 1.7 K/UL (ref 1–5.1)
LYMPHOCYTES NFR BLD: 19.5 %
MCH RBC QN AUTO: 31.2 PG (ref 26–34)
MCHC RBC AUTO-ENTMCNC: 33.7 G/DL (ref 31–36)
MCV RBC AUTO: 92.5 FL (ref 80–100)
MONOCYTES # BLD: 0.9 K/UL (ref 0–1.3)
MONOCYTES NFR BLD: 9.9 %
NEUTROPHILS # BLD: 5.9 K/UL (ref 1.7–7.7)
NEUTROPHILS NFR BLD: 68.4 %
PLATELET # BLD AUTO: 310 K/UL (ref 135–450)
PMV BLD AUTO: 7.2 FL (ref 5–10.5)
POTASSIUM SERPL-SCNC: 3.5 MMOL/L (ref 3.5–5.1)
PROT SERPL-MCNC: 7.8 G/DL (ref 6.4–8.2)
RBC # BLD AUTO: 4.51 M/UL (ref 4.2–5.9)
SODIUM SERPL-SCNC: 139 MMOL/L (ref 136–145)
WBC # BLD AUTO: 8.7 K/UL (ref 4–11)

## 2023-10-05 RX ORDER — DOLUTEGRAVIR SODIUM AND LAMIVUDINE 50; 300 MG/1; MG/1
1 TABLET, FILM COATED ORAL DAILY
Qty: 30 TABLET | Refills: 5 | Status: SHIPPED | OUTPATIENT
Start: 2023-10-05

## 2023-10-05 NOTE — PROGRESS NOTES
Infectious Diseases HIV Outpatient Note    HIV history:   Dx 3/15/21 at [facility] [employer], initial     Primary Care Physician:  JUDD Beltran CNP  Initial Visit:   4/8/21  History Obtained From:   Patient    CHIEF COMPLAINT:    Chief Complaint   Patient presents with    Follow-up     ART - no missed doses  No issues with depression or anxiety  Marijuana, smokes, occassional alcohol use        HISTORY OF PRESENT ILLNESS / Interval history:      MOST RECENT VISIT:  10/5/23 Follow-up visit  Pt feeling good. Today is his birthday. Taking and tolerating Dovato, NO missed doses     Now working at [employer]. Living alone - no sexual partners     #############    4/8/21 New pt visit - new HIV dx  Pt seen at [address] [place] [address] ED with rectal pain on 3/15, had test for STD, HIV - HIV test reactive. Seen in ED [facility] 3/20, rx Afton Galeazzi, referred to me  Seen by PCP 3/25  Seen by Psychologist 3/30    Pt reports he was surprised, upset, at time tearful regarding dx.  'did not expect it'  Has had no sx or related conditions (worse skin problem, shingles)    Sexual History:    Sexual relations with both men / woman. Pt reports 1 partners in last 1 mo, 2 in last 3 mo, 5 in last 6 mo, hx unprotected intercourse, hx receptive and insertive anal intercourse. Hx sexual encounter with HIV + person. No hx anonymous partner. No hx payment / drugs for sex. Hx STD (GC or chlamydia). No hx syphilis. Last neg HIV test - 2010 4/28/21 Follow-up visit  Pt doing well. Less 'upset' with diagnosis. Reviewed labs rio CD4/ VL.   Discussed meds, starting meds, adherence    2/23/23 Follow-up visit, NOT SEEN IN 1 yr 10 mo  Pt feeling good  Has been off meds since approx 4/2022, ran out of insurance and stopped    Updated sexual History:    Sexual

## 2023-10-08 LAB
HIV-1 QNT LOG, IU/ML: <1.47 LOG CPY/ML
HIV-1 QNT, IU/ML: ABNORMAL CPY/ML
INTERPRETATION: DETECTED

## 2023-10-09 ENCOUNTER — TELEPHONE (OUTPATIENT)
Dept: INFECTIOUS DISEASES | Age: 38
End: 2023-10-09

## 2023-10-09 NOTE — TELEPHONE ENCOUNTER
LVM asking pt to please return call to office  My name, Dr Quincy Lang name, office number  Line was unidentified

## 2024-02-27 ENCOUNTER — TELEPHONE (OUTPATIENT)
Dept: INFECTIOUS DISEASES | Age: 39
End: 2024-02-27

## 2024-02-27 NOTE — TELEPHONE ENCOUNTER
Received PA request from [facility] for Dovato 50-300mg    Attempted to do form on [facility] but was prompted to call [employer] at [phone]. Called [employer] and spoke with Palak who transferred me to [employer] ([phone]) where I spoke with Candance who states that I need to fax over last office notes, labs, any tried and failed meds and name/dose/frequency of medication needing PA.    I faxed over all [facility] information to Attn: Pre Determination Department @ [phone]

## 2024-03-17 ENCOUNTER — HOSPITAL ENCOUNTER (EMERGENCY)
Age: 39
Discharge: HOME | End: 2024-03-17
Attending: STUDENT IN AN ORGANIZED HEALTH CARE EDUCATION/TRAINING PROGRAM
Payer: COMMERCIAL

## 2024-03-17 VITALS
DIASTOLIC BLOOD PRESSURE: 91 MMHG | HEIGHT: 70 IN | TEMPERATURE: 100.4 F | HEART RATE: 99 BPM | RESPIRATION RATE: 16 BRPM | OXYGEN SATURATION: 99 % | SYSTOLIC BLOOD PRESSURE: 135 MMHG | WEIGHT: 248.46 LBS | BODY MASS INDEX: 35.57 KG/M2

## 2024-03-17 DIAGNOSIS — J02.9 ACUTE PHARYNGITIS, UNSPECIFIED ETIOLOGY: Primary | ICD-10-CM

## 2024-03-17 DIAGNOSIS — R05.1 ACUTE COUGH: ICD-10-CM

## 2024-03-17 LAB
FLUAV RNA UPPER RESP QL NAA+PROBE: NEGATIVE
FLUBV AG NPH QL: NEGATIVE
SARS-COV-2 RDRP RESP QL NAA+PROBE: NOT DETECTED

## 2024-03-17 PROCEDURE — 99283 EMERGENCY DEPT VISIT LOW MDM: CPT

## 2024-03-17 PROCEDURE — 6370000000 HC RX 637 (ALT 250 FOR IP): Performed by: STUDENT IN AN ORGANIZED HEALTH CARE EDUCATION/TRAINING PROGRAM

## 2024-03-17 PROCEDURE — 87635 SARS-COV-2 COVID-19 AMP PRB: CPT

## 2024-03-17 PROCEDURE — 87804 INFLUENZA ASSAY W/OPTIC: CPT

## 2024-03-17 RX ORDER — IBUPROFEN 600 MG/1
600 TABLET ORAL EVERY 6 HOURS PRN
Qty: 30 TABLET | Refills: 0 | Status: SHIPPED | OUTPATIENT
Start: 2024-03-17

## 2024-03-17 RX ORDER — GLYCERIN 0.25 %
1 DROPS OPHTHALMIC (EYE) PRN
Qty: 177 ML | Refills: 0 | Status: SHIPPED | OUTPATIENT
Start: 2024-03-17

## 2024-03-17 RX ORDER — IBUPROFEN 800 MG/1
800 TABLET ORAL ONCE
Status: COMPLETED | OUTPATIENT
Start: 2024-03-17 | End: 2024-03-17

## 2024-03-17 RX ORDER — ACETAMINOPHEN 325 MG/1
650 TABLET ORAL EVERY 6 HOURS PRN
Qty: 30 TABLET | Refills: 0 | Status: SHIPPED | OUTPATIENT
Start: 2024-03-17

## 2024-03-17 RX ORDER — ACETAMINOPHEN 500 MG
1000 TABLET ORAL
Status: COMPLETED | OUTPATIENT
Start: 2024-03-17 | End: 2024-03-17

## 2024-03-17 RX ADMIN — ACETAMINOPHEN 1000 MG: 500 TABLET ORAL at 01:40

## 2024-03-17 RX ADMIN — IBUPROFEN 800 MG: 800 TABLET, FILM COATED ORAL at 01:40

## 2024-03-17 ASSESSMENT — PAIN SCALES - GENERAL
PAINLEVEL_OUTOF10: 7
PAINLEVEL_OUTOF10: 7

## 2024-03-17 ASSESSMENT — PAIN - FUNCTIONAL ASSESSMENT: PAIN_FUNCTIONAL_ASSESSMENT: 0-10

## 2024-03-17 ASSESSMENT — PAIN DESCRIPTION - PAIN TYPE: TYPE: ACUTE PAIN

## 2024-03-17 ASSESSMENT — PAIN DESCRIPTION - DESCRIPTORS: DESCRIPTORS: ACHING

## 2024-03-17 ASSESSMENT — PAIN DESCRIPTION - LOCATION: LOCATION: GENERALIZED

## 2024-03-17 NOTE — PROGRESS NOTES
Pt to ER with concern for URI and cough x4 days. Per pt members of his family have been sick as well as people from work.    VSS  GCS 15      Swabs sent

## 2024-03-17 NOTE — DISCHARGE INSTRUCTIONS
Please follow up with primary care doctor. Please come back to the ED if you have chest pain or shortness of breath.

## 2024-03-19 NOTE — ED PROVIDER NOTES
[facility]    CHIEF COMPLAINT  URI and Cough       HISTORY OF PRESENT ILLNESS  Min Lam is a 38 y.o. male presenting to the ED for cough.  Patient also states that he has had a cough that produces green mucus.  Patient states he was febrile yesterday.  Patient does state that he has a coworker that is sick as well.  He reports chills.  Denies nausea or vomiting.  Patient denies chest pain, shortness of breath, abdominal pain.      - History obtained from: Patient   - Limitations to history: none    I have reviewed the following from the nursing documentation:    Past Medical History:   Diagnosis Date    Strong-Parkinson-White syndrome      Past Surgical History:   Procedure Laterality Date    ABLATION OF DYSRHYTHMIC FOCUS       Family History   Problem Relation Age of Onset    Hypertension Mother     Cancer Mother     Heart Disease Mother     Diabetes Father     Stroke Father     Colon Cancer Maternal Grandfather     Colon Cancer Maternal Cousin      Social History     Socioeconomic History    Marital status: Single     Spouse name: Not on file    Number of children: Not on file    Years of education: Not on file    Highest education level: Not on file   Occupational History    Not on file   Tobacco Use    Smoking status: Every Day     Current packs/day: 0.25     Types: Cigars, Cigarettes    Smokeless tobacco: Current   Vaping Use    Vaping Use: Not on file   Substance and Sexual Activity    Alcohol use: Yes     Comment: occassionally    Drug use: Yes     Types: Marijuana (Weed)    Sexual activity: Yes     Partners: Female, Male   Other Topics Concern    Not on file   Social History Narrative    Not on file     Social Determinants of Health     Financial Resource Strain: Low Risk  (4/27/2023)    Overall Financial Resource Strain (CARDIA)     Difficulty of Paying Living Expenses: Not hard at all   Food Insecurity: Not on file (4/27/2023)   Transportation Needs: Unknown (4/27/2023)

## 2024-03-29 RX ORDER — DOLUTEGRAVIR SODIUM AND LAMIVUDINE 50; 300 MG/1; MG/1
1 TABLET, FILM COATED ORAL DAILY
Qty: 30 TABLET | Refills: 2 | OUTPATIENT
Start: 2024-03-29

## 2024-04-04 ENCOUNTER — OFFICE VISIT (OUTPATIENT)
Dept: INFECTIOUS DISEASES | Age: 39
End: 2024-04-04

## 2024-04-04 VITALS — BODY MASS INDEX: 35.58 KG/M2 | WEIGHT: 248 LBS

## 2024-04-04 DIAGNOSIS — F32.A DEPRESSION, UNSPECIFIED DEPRESSION TYPE: ICD-10-CM

## 2024-04-04 DIAGNOSIS — B20 HUMAN IMMUNODEFICIENCY VIRUS (HCC): Primary | ICD-10-CM

## 2024-04-04 DIAGNOSIS — E66.9 CLASS 1 OBESITY WITHOUT SERIOUS COMORBIDITY WITH BODY MASS INDEX (BMI) OF 34.0 TO 34.9 IN ADULT, UNSPECIFIED OBESITY TYPE: ICD-10-CM

## 2024-04-04 NOTE — PROGRESS NOTES
Infectious Diseases HIV Outpatient Note    HIV history:   Dx 3/15/21 at [facility], initial     Primary Care Physician:  Jacquelin Zee APRN - CNP  Initial Visit:   4/8/21  History Obtained From:   Patient    CHIEF COMPLAINT:    Chief Complaint   Patient presents with    Follow-up       HISTORY OF PRESENT ILLNESS / Interval history:      MOST RECENT VISIT:    4/4/24 Follow-up visit (previous visit 10/5/23)    Min Lam, was evaluated through a synchronous (real-time) audio-video encounter. The patient (or guardian if applicable) is aware that this is a billable service, which includes applicable co-pays. This Virtual Visit was conducted with patient's (and/or legal guardian's) consent. Patient identification was verified, and a caregiver was present when appropriate.   The patient was located at [facility]: [address] [place] [address]  [address] [facility] [address]  Provider was located at Facility (Appt Dept): [address] [place], [place] [id number]  Confirm you are appropriately licensed, registered, or certified to deliver care in the [facility] where the patient is located as indicated above. If you are not or unsure, please re-schedule the visit: Yes, I confirm.     Taking and tolerating Dovato, NO missed doses   Reviewed labs from last visit - 10/5/23 CD4 476, VL <30    Pt feeling good today.  Had 'cold' / 'bug' last week    - seen at [facility] ED on 3/17, Influenza / COVID neg    Now working at [employer].  Living with sister.  No sexual partners     #############    4/8/21 New pt visit - new HIV dx  Pt seen at [facility] ED with rectal pain on 3/15, had test for STD, HIV - HIV test reactive.  Seen in ED [facility] 3/20, rx ambien, referred to me  Seen by PCP 3/25  Seen by Psychologist 3/30    Pt reports he was

## 2024-04-05 RX ORDER — DOLUTEGRAVIR SODIUM AND LAMIVUDINE 50; 300 MG/1; MG/1
1 TABLET, FILM COATED ORAL DAILY
Qty: 30 TABLET | Refills: 5 | Status: SHIPPED | OUTPATIENT
Start: 2024-04-05

## 2024-04-08 NOTE — TELEPHONE ENCOUNTER
Spoke with pt  He took his last Dovato this am  Ani asked pt to contact our office for samples as they get patient re-enrolled in the [facility] program  Pt is without insurance at this time and has started a new job and has not yet started with new insurance

## 2024-07-15 RX ORDER — DOLUTEGRAVIR SODIUM AND LAMIVUDINE 50; 300 MG/1; MG/1
1 TABLET, FILM COATED ORAL DAILY
Qty: 30 TABLET | Refills: 5 | OUTPATIENT
Start: 2024-07-15

## 2024-07-23 RX ORDER — DOLUTEGRAVIR SODIUM AND LAMIVUDINE 50; 300 MG/1; MG/1
1 TABLET, FILM COATED ORAL DAILY
Qty: 30 TABLET | Refills: 5 | Status: SHIPPED | OUTPATIENT
Start: 2024-07-23

## 2024-07-23 NOTE — TELEPHONE ENCOUNTER
Patient requesting refill on Dovato    Last Appt: 4/4/24  Next Appt: 10/10/24    Last Labs: 10/5/23

## 2024-08-08 ENCOUNTER — HOSPITAL ENCOUNTER (EMERGENCY)
Age: 39
Discharge: HOME | End: 2024-08-08
Attending: EMERGENCY MEDICINE

## 2024-08-08 VITALS
TEMPERATURE: 98.2 F | SYSTOLIC BLOOD PRESSURE: 138 MMHG | BODY MASS INDEX: 34.4 KG/M2 | HEIGHT: 70 IN | DIASTOLIC BLOOD PRESSURE: 93 MMHG | WEIGHT: 240.3 LBS | OXYGEN SATURATION: 98 % | HEART RATE: 90 BPM | RESPIRATION RATE: 16 BRPM

## 2024-08-08 DIAGNOSIS — R51.9 NONINTRACTABLE EPISODIC HEADACHE, UNSPECIFIED HEADACHE TYPE: Primary | ICD-10-CM

## 2024-08-08 PROCEDURE — 99283 EMERGENCY DEPT VISIT LOW MDM: CPT

## 2024-08-08 PROCEDURE — 6370000000 HC RX 637 (ALT 250 FOR IP): Performed by: EMERGENCY MEDICINE

## 2024-08-08 RX ORDER — ONDANSETRON 4 MG/1
4 TABLET, ORALLY DISINTEGRATING ORAL ONCE
Status: COMPLETED | OUTPATIENT
Start: 2024-08-08 | End: 2024-08-08

## 2024-08-08 RX ORDER — NAPROXEN 500 MG/1
500 TABLET ORAL 2 TIMES DAILY WITH MEALS
Qty: 60 TABLET | Refills: 5 | Status: SHIPPED | OUTPATIENT
Start: 2024-08-08

## 2024-08-08 RX ORDER — NAPROXEN 250 MG/1
500 TABLET ORAL ONCE
Status: COMPLETED | OUTPATIENT
Start: 2024-08-08 | End: 2024-08-08

## 2024-08-08 RX ORDER — ONDANSETRON 4 MG/1
4 TABLET, FILM COATED ORAL EVERY 8 HOURS PRN
Qty: 20 TABLET | Refills: 0 | Status: SHIPPED | OUTPATIENT
Start: 2024-08-08

## 2024-08-08 RX ADMIN — ONDANSETRON 4 MG: 4 TABLET, ORALLY DISINTEGRATING ORAL at 21:26

## 2024-08-08 RX ADMIN — NAPROXEN SODIUM 500 MG: 250 TABLET ORAL at 21:26

## 2024-08-08 ASSESSMENT — PAIN - FUNCTIONAL ASSESSMENT: PAIN_FUNCTIONAL_ASSESSMENT: 0-10

## 2024-08-08 ASSESSMENT — LIFESTYLE VARIABLES
HOW MANY STANDARD DRINKS CONTAINING ALCOHOL DO YOU HAVE ON A TYPICAL DAY: 3 OR 4
HOW OFTEN DO YOU HAVE A DRINK CONTAINING ALCOHOL: 2-4 TIMES A MONTH

## 2024-08-08 ASSESSMENT — PAIN SCALES - GENERAL: PAINLEVEL_OUTOF10: 0

## 2024-08-09 NOTE — ED TRIAGE NOTES
Patient to ED for hypertension.  Patient is alert and oriented with GCS 15.  Patient reports hx HTN and has had issues with his blood  pressure lately.  Patient reports that he is complaint with his lisinopril but not his amlodipine currently.  Patient denies any HA, double or blurred vision at this time.

## 2024-08-10 NOTE — ED PROVIDER NOTES
sepsis or septic shock?   No   Exclusion criteria - the patient is NOT to be included for SEP-1 Core Measure due to:  2+ SIRS criteria are not met    CC/HPI Summary, DDx, ED Course, and Reassessment: Differential diagnosis: Asymptomatic hypertension, hypertensive urgency, hypertensive emergency, hypertension encephalopathy, acute coronary syndrome, acute renal failure, Thrombotic Stroke, Embolic Stroke, Hemorrhagic Stroke, pain or vertigo or other medical problems elevating the blood pressure secondarily which is a normal physiologic response, other    38-year-old male presents ED for lightheadedness and nausea.  Reports that symptoms have resolved on evaluation.  Patient's vital signs are within normal limits.  NIH is 0.  Patient reports that he just felt lightheaded and denies dizziness.     Patient given antiemetics and naproxen.  He is discharged home with outpatient follow-up.     CONSULTS: (Who and What was discussed)  None          Chronic Conditions:   Past Medical History:   Diagnosis Date    Strong-Parkinson-White syndrome          Records Reviewed (External and source): Reviewed patient's most recent PCP note from 6/5/2024    Disposition Considerations (include 1 Tests not done, Admit vs D/C, Shared Decision Making, Pt Expectation of Test or Tx.): Consider obtaining laboratory workup patient denies GI losses or changes in p.o. intake and is currently asymptomatic I believe he would benefit from additional testing at this time.     Admission to the hospital considered but patient's symptoms are improving.  Vital signs and testing performed is reassuring.  Based on this patient is appropriate for outpatient management.  No indication for admission at this time.     Symptomatic treatment with expectant management discussed with the patient and/or family member or surrogates present and they are amenable to treatment plan and outpatient follow-up.  Strict return precautions were discussed with the patient and  those present.  All parties involved were informed that condition may persist or worsen in which case they may then require inpatient treatment, currently there is no indication.  They demonstrated understanding of when to return to the emergency department for new or worsening symptoms.      I am the Primary Clinician of Record.    FINAL IMPRESSION      1. Nonintractable episodic headache, unspecified headache type          DISPOSITION/PLAN     DISPOSITION Decision To Discharge 08/08/2024 09:21:33 PM      PATIENT REFERRED TO:  Jacquelin Zee, APRN - [facility]  [address]  [facility] [id number] [phone]    Schedule an appointment as soon as possible for a visit         DISCHARGE MEDICATIONS:  Discharge Medication List as of 8/8/2024  9:24 PM        START taking these medications    Details   naproxen (NAPROSYN) 500 MG tablet Take 1 tablet by mouth 2 times daily (with meals), Disp-60 tablet, R-5Normal      ondansetron (ZOFRAN) 4 MG tablet Take 1 tablet by mouth every 8 hours as needed for Nausea, Disp-20 tablet, R-0Normal             DISCONTINUED MEDICATIONS:  Discharge Medication List as of 8/8/2024  9:24 PM                 (Please note that portions of this note were completed with a voice recognition program.  Efforts were made to edit the dictations but occasionally words are mis-transcribed.)    Roberto Ridley MD (electronically signed)            Roberto Ridley MD  08/10/24 [id number]

## 2024-08-21 RX ORDER — ERGOCALCIFEROL 1.25 MG/1
50000 CAPSULE ORAL WEEKLY
Qty: 12 CAPSULE | Refills: 1 | Status: SHIPPED | OUTPATIENT
Start: 2024-08-21

## 2024-08-21 RX ORDER — DOLUTEGRAVIR SODIUM AND LAMIVUDINE 50; 300 MG/1; MG/1
1 TABLET, FILM COATED ORAL DAILY
Qty: 30 TABLET | Refills: 5 | Status: SHIPPED | OUTPATIENT
Start: 2024-08-21

## 2024-10-23 ENCOUNTER — OFFICE VISIT (OUTPATIENT)
Dept: INFECTIOUS DISEASES | Age: 39
End: 2024-10-23
Payer: COMMERCIAL

## 2024-10-23 VITALS
OXYGEN SATURATION: 97 % | WEIGHT: 249 LBS | DIASTOLIC BLOOD PRESSURE: 80 MMHG | SYSTOLIC BLOOD PRESSURE: 139 MMHG | HEART RATE: 77 BPM | BODY MASS INDEX: 35.73 KG/M2

## 2024-10-23 DIAGNOSIS — E66.811 CLASS 1 OBESITY WITHOUT SERIOUS COMORBIDITY WITH BODY MASS INDEX (BMI) OF 34.0 TO 34.9 IN ADULT, UNSPECIFIED OBESITY TYPE: ICD-10-CM

## 2024-10-23 DIAGNOSIS — B20 HUMAN IMMUNODEFICIENCY VIRUS (HCC): ICD-10-CM

## 2024-10-23 DIAGNOSIS — F32.A DEPRESSION, UNSPECIFIED DEPRESSION TYPE: ICD-10-CM

## 2024-10-23 DIAGNOSIS — B20 HUMAN IMMUNODEFICIENCY VIRUS (HCC): Primary | ICD-10-CM

## 2024-10-23 LAB
BASOPHILS # BLD: 0 K/UL (ref 0–0.2)
BASOPHILS NFR BLD: 0.8 %
DEPRECATED RDW RBC AUTO: 14.3 % (ref 12.4–15.4)
EOSINOPHIL # BLD: 0.2 K/UL (ref 0–0.6)
EOSINOPHIL NFR BLD: 3 %
HCT VFR BLD AUTO: 43.9 % (ref 40.5–52.5)
HGB BLD-MCNC: 14.6 G/DL (ref 13.5–17.5)
LYMPHOCYTES # BLD: 1.5 K/UL (ref 1–5.1)
LYMPHOCYTES NFR BLD: 25.7 %
MCH RBC QN AUTO: 30.9 PG (ref 26–34)
MCHC RBC AUTO-ENTMCNC: 33.3 G/DL (ref 31–36)
MCV RBC AUTO: 92.8 FL (ref 80–100)
MONOCYTES # BLD: 0.5 K/UL (ref 0–1.3)
MONOCYTES NFR BLD: 9.2 %
NEUTROPHILS # BLD: 3.6 K/UL (ref 1.7–7.7)
NEUTROPHILS NFR BLD: 61.3 %
PLATELET # BLD AUTO: 268 K/UL (ref 135–450)
PMV BLD AUTO: 8 FL (ref 5–10.5)
RBC # BLD AUTO: 4.73 M/UL (ref 4.2–5.9)
WBC # BLD AUTO: 5.9 K/UL (ref 4–11)

## 2024-10-23 PROCEDURE — 90732 PPSV23 VACC 2 YRS+ SUBQ/IM: CPT | Performed by: INTERNAL MEDICINE

## 2024-10-23 PROCEDURE — 99215 OFFICE O/P EST HI 40 MIN: CPT | Performed by: INTERNAL MEDICINE

## 2024-10-23 PROCEDURE — 90471 IMMUNIZATION ADMIN: CPT | Performed by: INTERNAL MEDICINE

## 2024-10-23 PROCEDURE — 90636 HEP A/HEP B VACC ADULT IM: CPT | Performed by: INTERNAL MEDICINE

## 2024-10-23 PROCEDURE — 90472 IMMUNIZATION ADMIN EACH ADD: CPT | Performed by: INTERNAL MEDICINE

## 2024-10-23 PROCEDURE — 90734 MENACWYD/MENACWYCRM VACC IM: CPT | Performed by: INTERNAL MEDICINE

## 2024-10-23 PROCEDURE — 90661 CCIIV3 VAC ABX FR 0.5 ML IM: CPT | Performed by: INTERNAL MEDICINE

## 2024-10-23 NOTE — PROGRESS NOTES
Infectious Diseases HIV Outpatient Note    HIV history:   Dx 3/15/21 at [facility], initial     Primary Care Physician:  Jacquelin Zee APRN - CNP  Initial Visit:   21  History Obtained From:   Patient    CHIEF COMPLAINT:    Chief Complaint   Patient presents with    Follow-up     ART - no missed doses  Still working at [employer]       HISTORY OF PRESENT ILLNESS / Interval history:      MOST RECENT VISIT:    10/23/24 Follow-up visit (previous 24)    Taking and tolerating Dovato, NO missed doses   Reviewed last labs - 10/5//23 CD4 476, VL <30    Pt feeling good today.  No recent illness    Now working at [employer]   Living with sister.  Brother  with MI (age 43)  No sexual partners     #############    21 New pt visit - new HIV dx  Pt seen at [facility] ED with rectal pain on 3/15, had test for STD, HIV - HIV test reactive.  Seen in ED [facility] [place] 3/20, rx ambien, referred to me  Seen by PCP 3/25  Seen by Psychologist 3/30    Pt reports he was surprised, upset, at time tearful regarding dx.  'did not expect it'  Has had no sx or related conditions (worse skin problem, shingles)    Sexual History:    Sexual relations with both men / woman.  Pt reports 1 partners in last 1 mo, 2 in last 3 mo, 5 in last 6 mo, hx unprotected intercourse, hx receptive and insertive anal intercourse.  Hx sexual encounter with HIV + person.  No hx anonymous partner.  No hx payment / drugs for sex.  Hx STD (GC or chlamydia).  No hx syphilis.  Last neg HIV test - 21 Follow-up visit  Pt doing well.  Less 'upset' with diagnosis.  Reviewed labs rio CD4/ VL.  Discussed meds, starting meds, adherence    23 Follow-up visit, NOT SEEN IN 1 yr 10 mo  Pt feeling good  Has been off meds since approx 2022, ran out of

## 2024-10-24 LAB
25(OH)D3 SERPL-MCNC: 12.1 NG/ML
ALBUMIN SERPL-MCNC: 4.4 G/DL (ref 3.4–5)
ALBUMIN/GLOB SERPL: 1.5 {RATIO} (ref 1.1–2.2)
ALP SERPL-CCNC: 82 U/L (ref 40–129)
ALT SERPL-CCNC: 15 U/L (ref 10–40)
ANION GAP SERPL CALCULATED.3IONS-SCNC: 8 MMOL/L (ref 3–16)
AST SERPL-CCNC: 15 U/L (ref 15–37)
BILIRUB SERPL-MCNC: 0.3 MG/DL (ref 0–1)
BUN SERPL-MCNC: 9 MG/DL (ref 7–20)
CALCIUM SERPL-MCNC: 9.4 MG/DL (ref 8.3–10.6)
CHLORIDE SERPL-SCNC: 103 MMOL/L (ref 99–110)
CHOLEST SERPL-MCNC: 156 MG/DL (ref 0–199)
CO2 SERPL-SCNC: 25 MMOL/L (ref 21–32)
CREAT SERPL-MCNC: 0.9 MG/DL (ref 0.9–1.3)
GFR SERPLBLD CREATININE-BSD FMLA CKD-EPI: >90 ML/MIN/{1.73_M2}
GLUCOSE SERPL-MCNC: 93 MG/DL (ref 70–99)
HDLC SERPL-MCNC: 32 MG/DL (ref 40–60)
LDL CHOLESTEROL: 102 MG/DL
POTASSIUM SERPL-SCNC: 3.8 MMOL/L (ref 3.5–5.1)
PROT SERPL-MCNC: 7.3 G/DL (ref 6.4–8.2)
SODIUM SERPL-SCNC: 136 MMOL/L (ref 136–145)
TRIGL SERPL-MCNC: 110 MG/DL (ref 0–150)
VLDLC SERPL CALC-MCNC: 22 MG/DL

## 2024-10-25 LAB
HIV-1 QNT LOG, IU/ML: ABNORMAL LOG CPY/ML
HIV-1 QNT, IU/ML: ABNORMAL CPY/ML
INTERPRETATION: DETECTED

## 2024-10-28 RX ORDER — ERGOCALCIFEROL 1.25 MG/1
50000 CAPSULE, LIQUID FILLED ORAL WEEKLY
Qty: 12 CAPSULE | Refills: 1 | Status: SHIPPED | OUTPATIENT
Start: 2024-10-28

## 2024-10-28 NOTE — RESULT ENCOUNTER NOTE
Call pt - CD4 636, VL<30, CBC/liver/kidney/chol normal  Low Vit D - reordered Ergocalciferol 50k - 1 tab once a week, sent to [facility]

## 2024-10-29 ENCOUNTER — TELEPHONE (OUTPATIENT)
Dept: INFECTIOUS DISEASES | Age: 39
End: 2024-10-29

## 2024-10-29 NOTE — TELEPHONE ENCOUNTER
Spoke with pt  He verbalized understanding and stated he received a message from pharmacy that he had a Rx ready for pickup

## 2024-10-29 NOTE — TELEPHONE ENCOUNTER
----- Message from Dr. Carlos Vigli MD sent at 10/28/2024  3:16 PM EDT -----  Call pt - CD4 636, VL<30, CBC/liver/kidney/chol normal  Low Vit D - reordered Ergocalciferol 50k - 1 tab once a week, sent to [facility] on [facility]

## 2024-10-29 NOTE — TELEPHONE ENCOUNTER
Left vm for pt asking for a return call to office to discuss lab results  My name, title, Dr Vigil's name,   Pt name   My direct number [phone]

## 2024-11-05 RX ORDER — ERGOCALCIFEROL 1.25 MG/1
50000 CAPSULE, LIQUID FILLED ORAL WEEKLY
Qty: 12 CAPSULE | Refills: 0 | Status: SHIPPED | OUTPATIENT
Start: 2024-11-05

## 2024-11-05 RX ORDER — DOLUTEGRAVIR SODIUM AND LAMIVUDINE 50; 300 MG/1; MG/1
1 TABLET, FILM COATED ORAL DAILY
Qty: 90 TABLET | Refills: 3 | Status: SHIPPED | OUTPATIENT
Start: 2024-11-05

## 2025-03-03 RX ORDER — ERGOCALCIFEROL 1.25 MG/1
50000 CAPSULE, LIQUID FILLED ORAL WEEKLY
Qty: 12 CAPSULE | Refills: 1 | Status: SHIPPED | OUTPATIENT
Start: 2025-03-03 | End: 2025-06-01

## 2025-05-21 ENCOUNTER — OFFICE VISIT (OUTPATIENT)
Dept: INFECTIOUS DISEASES | Age: 40
End: 2025-05-21

## 2025-05-21 VITALS
HEART RATE: 89 BPM | TEMPERATURE: 97.9 F | WEIGHT: 245 LBS | DIASTOLIC BLOOD PRESSURE: 80 MMHG | BODY MASS INDEX: 35.07 KG/M2 | SYSTOLIC BLOOD PRESSURE: 113 MMHG | HEIGHT: 70 IN

## 2025-05-21 DIAGNOSIS — Z72.0 TOBACCO USE: ICD-10-CM

## 2025-05-21 DIAGNOSIS — F12.90 MARIJUANA USE: ICD-10-CM

## 2025-05-21 DIAGNOSIS — E66.811 CLASS 1 OBESITY WITHOUT SERIOUS COMORBIDITY WITH BODY MASS INDEX (BMI) OF 34.0 TO 34.9 IN ADULT, UNSPECIFIED OBESITY TYPE: ICD-10-CM

## 2025-05-21 DIAGNOSIS — B20 HUMAN IMMUNODEFICIENCY VIRUS (HCC): Primary | ICD-10-CM

## 2025-05-21 PROCEDURE — 90677 PCV20 VACCINE IM: CPT | Performed by: INTERNAL MEDICINE

## 2025-05-21 PROCEDURE — 99215 OFFICE O/P EST HI 40 MIN: CPT | Performed by: INTERNAL MEDICINE

## 2025-05-21 PROCEDURE — 90471 IMMUNIZATION ADMIN: CPT | Performed by: INTERNAL MEDICINE

## 2025-05-21 NOTE — PROGRESS NOTES
Infectious Diseases HIV Outpatient Note    HIV history:   Dx 3/15/21 at [facility], initial visit 4/8/21    Primary Care Physician:  Jacquelin Zee APRN - CNP  Initial Visit:   4/8/21  History Obtained From:   Patient    CHIEF COMPLAINT:    Chief Complaint   Patient presents with    Follow-up     HIV       HISTORY OF PRESENT ILLNESS / Interval history:      MOST RECENT VISIT:    5/21/25 Follow-up visit (previous 10/23/24)  Taking and tolerating Dovato, NO missed doses   Reviewed last labs - 10/23/24 CD4 636, VL <20    Pt feeling good today.  No recent illness    Working at [employer]   Living with cousin.    No sexual partners     #############    4/8/21 New pt visit - new HIV dx  Pt seen at [facility] ED with rectal pain on 3/15, had test for STD, HIV - HIV test reactive.  Seen in ED [facility] 3/20, rx ambien, referred to me  Seen by PCP 3/25  Seen by Psychologist 3/30    Pt reports he was surprised, upset, at time tearful regarding dx.  'did not expect it'  Has had no sx or related conditions (worse skin problem, shingles)    Sexual History:    Sexual relations with both men / woman.  Pt reports 1 partners in last 1 mo, 2 in last 3 mo, 5 in last 6 mo, hx unprotected intercourse, hx receptive and insertive anal intercourse.  Hx sexual encounter with HIV + person.  No hx anonymous partner.  No hx payment / drugs for sex.  Hx STD (GC or chlamydia).  No hx syphilis.  Last neg HIV test - 2010 4/28/21 Follow-up visit  Pt doing well.  Less 'upset' with diagnosis.  Reviewed labs rio CD4/ VL.  Discussed meds, starting meds, adherence    2/23/23 Follow-up visit, NOT SEEN IN 1 yr 10 mo  Pt feeling good  Has been off meds since approx 4/2022, ran out of insurance and stopped    Updated sexual History:    Sexual relations with both men / woman.  Pt reports 0 in last 6 mo, hx unprotected intercourse, hx receptive and insertive anal intercourse.  Hx sexual encounter with HIV + person.  No hx anonymous partner.  No hx

## 2025-06-11 ENCOUNTER — TELEPHONE (OUTPATIENT)
Dept: INFECTIOUS DISEASES | Age: 40
End: 2025-06-11

## 2025-06-11 NOTE — TELEPHONE ENCOUNTER
Received a vm from [facility] in [facility]  Requsting Rx for pt meds  Meds not listed in vm nor where to sent the Rx  Attempted to return call, left  stating I was returning a call from Jessi  My name, title, Dr Vigil's name, specialty and affiliation  Pt name and +  My direct number [phone]

## 2025-06-12 RX ORDER — DOLUTEGRAVIR SODIUM AND LAMIVUDINE 50; 300 MG/1; MG/1
1 TABLET, FILM COATED ORAL DAILY
Qty: 30 TABLET | Refills: 1 | Status: SHIPPED | OUTPATIENT
Start: 2025-06-12 | End: 2025-07-12

## 2025-06-12 NOTE — TELEPHONE ENCOUNTER
Pt is in treatment facility in [place], [place]., [facility]  Lin, nurse at [facility], called requesting refill of Dovato for pt as he is out.  Lin asked for the Rx to be seen to [facility] so it can be delivered to the facility  Pharmacy added to pt profile and escribed

## 2025-07-17 ENCOUNTER — TELEPHONE (OUTPATIENT)
Dept: INFECTIOUS DISEASES | Age: 40
End: 2025-07-17

## 2025-07-17 DIAGNOSIS — B20 HUMAN IMMUNODEFICIENCY VIRUS (HCC): Primary | ICD-10-CM

## 2025-07-17 DIAGNOSIS — E55.9 VITAMIN D DEFICIENCY: ICD-10-CM

## 2025-07-17 NOTE — TELEPHONE ENCOUNTER
Pt called stating he was in rehab for 45 days and missed several days of his Dovato.  Pt is requesting an appt and labs  Pt scheduled on 8/28 at 1500  Labs entered for pt